# Patient Record
Sex: FEMALE | Race: WHITE | Employment: UNEMPLOYED | URBAN - METROPOLITAN AREA
[De-identification: names, ages, dates, MRNs, and addresses within clinical notes are randomized per-mention and may not be internally consistent; named-entity substitution may affect disease eponyms.]

---

## 2017-06-19 PROBLEM — G89.29 CHRONIC BACK PAIN: Status: ACTIVE | Noted: 2017-06-19

## 2017-06-19 PROBLEM — M54.9 CHRONIC BACK PAIN: Status: ACTIVE | Noted: 2017-06-19

## 2018-06-08 ENCOUNTER — HOSPITAL ENCOUNTER (OUTPATIENT)
Dept: MAMMOGRAPHY | Age: 53
Discharge: HOME | End: 2018-06-10
Payer: COMMERCIAL

## 2018-06-08 DIAGNOSIS — Z12.31 ENCOUNTER FOR SCREENING MAMMOGRAM FOR MALIGNANT NEOPLASM OF BREAST: ICD-10-CM

## 2018-06-08 PROCEDURE — 77067 SCR MAMMO BI INCL CAD: CPT

## 2018-12-13 ENCOUNTER — HOSPITAL ENCOUNTER (OUTPATIENT)
Age: 53
Discharge: HOME | End: 2018-12-15
Payer: COMMERCIAL

## 2018-12-13 LAB — ALCOHOL URINE: NOT DETECTED MG/DL

## 2018-12-13 PROCEDURE — G0480 DRUG TEST DEF 1-7 CLASSES: HCPCS

## 2018-12-13 PROCEDURE — 80307 DRUG TEST PRSMV CHEM ANLYZR: CPT

## 2018-12-18 LAB
6AM URINE: <10 NG/ML
CODEINE, URINE: <20 NG/ML
HYDROCODONE, URINE: <20 NG/ML
HYDROMORPHONE, URINE: <20 NG/ML
MORPHINE URINE: <20 NG/ML
NORHYDROCODONE, URINE: <20 NG/ML
NOROXYCODONE, URINE: <20 NG/ML
NOROXYMORPHONE, URINE: <20 NG/ML
O-DESMETHYLTRAMADOL,UR: <25 NG/ML
OXYCODONE, URINE CONFIRMATION: <20 NG/ML
OXYMORPHONE, URINE: <20 NG/ML
TRAMADOL, URINE: NORMAL NG/ML

## 2019-03-07 ENCOUNTER — HOSPITAL ENCOUNTER (OUTPATIENT)
Age: 54
Discharge: HOME | End: 2019-03-09
Payer: COMMERCIAL

## 2019-03-07 LAB
AMPHETAMINE SCREEN, URINE: NOT DETECTED
BARBITURATE SCREEN URINE: NOT DETECTED
BENZODIAZEPINE SCREEN, URINE: NOT DETECTED
CANNABINOID SCREEN URINE: NOT DETECTED
COCAINE METABOLITE SCREEN URINE: NOT DETECTED
METHADONE SCREEN, URINE: NOT DETECTED
OPIATE SCREEN URINE: NOT DETECTED
PHENCYCLIDINE SCREEN URINE: NOT DETECTED
PROPOXYPHENE SCREEN: NOT DETECTED

## 2019-03-07 PROCEDURE — G0480 DRUG TEST DEF 1-7 CLASSES: HCPCS

## 2019-03-07 PROCEDURE — 80307 DRUG TEST PRSMV CHEM ANLYZR: CPT

## 2019-03-08 LAB — ALCOHOL URINE: NOT DETECTED MG/DL

## 2020-09-10 ENCOUNTER — HOSPITAL ENCOUNTER (EMERGENCY)
Age: 55
Discharge: HOME | End: 2020-09-10
Payer: COMMERCIAL

## 2020-09-10 VITALS
SYSTOLIC BLOOD PRESSURE: 126 MMHG | DIASTOLIC BLOOD PRESSURE: 84 MMHG | TEMPERATURE: 97.8 F | WEIGHT: 130 LBS | OXYGEN SATURATION: 98 % | HEIGHT: 65 IN | RESPIRATION RATE: 16 BRPM | HEART RATE: 82 BPM | BODY MASS INDEX: 21.66 KG/M2

## 2020-09-10 PROCEDURE — 99282 EMERGENCY DEPT VISIT SF MDM: CPT

## 2020-09-10 PROCEDURE — 6370000000 HC RX 637 (ALT 250 FOR IP): Performed by: PHYSICIAN ASSISTANT

## 2020-09-10 PROCEDURE — 99281 EMR DPT VST MAYX REQ PHY/QHP: CPT

## 2020-09-10 RX ORDER — CEPHALEXIN 250 MG/1
500 CAPSULE ORAL ONCE
Status: COMPLETED | OUTPATIENT
Start: 2020-09-10 | End: 2020-09-10

## 2020-09-10 RX ORDER — CEPHALEXIN 500 MG/1
500 CAPSULE ORAL 4 TIMES DAILY
Qty: 28 CAPSULE | Refills: 0 | Status: SHIPPED | OUTPATIENT
Start: 2020-09-10 | End: 2020-09-17

## 2020-09-10 RX ADMIN — CEPHALEXIN 500 MG: 250 CAPSULE ORAL at 19:29

## 2020-09-10 ASSESSMENT — PAIN DESCRIPTION - LOCATION: LOCATION: LEG

## 2020-09-10 ASSESSMENT — PAIN DESCRIPTION - PAIN TYPE: TYPE: ACUTE PAIN

## 2020-09-10 ASSESSMENT — PAIN DESCRIPTION - DESCRIPTORS: DESCRIPTORS: BURNING

## 2020-09-10 ASSESSMENT — PAIN DESCRIPTION - ORIENTATION: ORIENTATION: LEFT

## 2020-09-10 ASSESSMENT — PAIN SCALES - GENERAL: PAINLEVEL_OUTOF10: 4

## 2020-09-10 NOTE — ED NOTES
WOUND CLEANSER TO INCISION LINE LT INNER LEG. DSD TO OPEN AREA PROXIMAL INCISION. ACEWRAP TO HOLD DSD PT STATES IT FEELS BETTER.      Blake Rizvi, CHRISTOPHER  02/83/38 [id number]

## 2020-09-10 NOTE — ED PROVIDER NOTES
[facility]                                                                                                                                      Department of Emergency Medicine   ED  Provider Note  Admit Date/RoomTime: 9/10/2020  7:00 PM  ED Room: [place] [address]/[facility]-02        HPI:  9/10/20,   Time: 7:18 PM EDT         Olivia Boothe is a 48 y.o. female presenting to the ED for woumd concern, beginning 1 hour ago. The complaint has been persistent, mild in severity, and worsened by nothing. Pt states she had skin lesion removed from left lower leg by Dr. Frances Solis 1 week ago. Had post op exam and suture removal yesterday in office by him. States she squatted down earlier today and the upper wound split apart. Pt states there has been slight bleeding. No other drainage or pus. No diffuse erythema. Pt denies fever, chills or vomiting. ROS:     Constitutional: Negative for fever and chills  HENT: Negative for ear pain, sore throat and sinus pressure  Eyes: Negative for pain, discharge and redness  Respiratory:  Negative for shortness of breath, cough and wheezing  Cardiovascular: Negative for CP, edema or palpitations  Gastrointestinal: Negative for nausea, vomiting, diarrhea and abdominal distention  Genitourinary: Negative for dysuria and frequency  Musculoskeletal: See HPi  Skin: See HPI  Neurological: Negative for weakness and headaches  Hematological: Negative for adenopathy    All other systems reviewed and are negative      -------------------------------- PAST HISTORY ----------------------------------  Past Medical History:  has a past medical history of Anxiety, Arthritis, Chronic back pain, Depression, Fatigue, GERD (gastroesophageal reflux disease), Headache(784.0), Hyperlipidemia, MDRO (multiple drug resistant organisms) resistance, Memory loss, Neck pain, Numbness, and S/P cerebral aneurysm repair.     Past Surgical History:  has a past surgical history that includes hernia repair;  section; Appendectomy; Colonoscopy; Upper gastrointestinal endoscopy; Tonsillectomy; Endometrial ablation; joint replacement (Right, nov 2014); hip surgery; and Brain aneurysm surgery (12/2009). Social History:  reports that she has been smoking. She has a 45.00 pack-year smoking history. She has never used smokeless tobacco. She reports current alcohol use of about 4.0 standard drinks of alcohol per week. She reports that she does not use drugs. Family History: family history is not on file. She was adopted. The patients home medications have been reviewed. Allergies: Patient has no known allergies. --------------------------------- RESULTS ------------------------------------------  All laboratory and radiology results have been personally reviewed by myself   LABS:  No results found for this visit on 09/10/20. RADIOLOGY:  Interpreted by Radiologist.  No orders to display       ----------------- NURSING NOTES AND VITALS REVIEWED ---------------   The nursing notes within the ED encounter and vital signs as below have been reviewed. /84   Pulse 82   Temp 97.8 °F (36.6 °C) (Oral)   Resp 16   Ht 5' 4.5\" (1.638 m)   Wt 130 lb (59 kg)   SpO2 98%   BMI 21.97 kg/m²   Oxygen Saturation Interpretation: Normal      --------------------------------PHYSICAL EXAM------------------------------------      Constitutional/General: Alert and oriented x3, well appearing, non toxic in NAD  Head: NC/AT  Eyes: PERRL, EOMI  Mouth: Oropharynx clear, handling secretions, no trismus  Neck: Supple, full ROM, no meningeal signs  Pulmonary: Lungs clear to auscultation bilaterally, no wheezes, rales, or rhonchi. Not in respiratory distress  Cardiovascular:  Regular rate and rhythm, no murmurs, gallops, or rubs. 2+ distal pulses  Extremities: Moves all extremities x 4. Pt has 6 cm wound medial left lower leg. Fresh suture scars noted. The upper 2 cm wound is open and gaping. Fresh blood seen at site.    No pus or erythema. NO diffuse cellulitis. Warm and well perfused  Skin:  See HPI  Neurologic: GCS 15,  Intact. No focal deficits  Psych: Normal Affect      ------------------------ ED COURSE/MEDICAL DECISION MAKING----------------------  Medications   cephALEXin (KEFLEX) capsule 500 mg (has no administration in time range)         Medical Decision Making: The top portion of wound is now open. Discussed with patient that we can't sew this back up again due to concern for infection. Plan Keflex for here and home. Discussed wound care. Counseling: The emergency provider has spoken with the patient and discussed todays results, in addition to providing specific details for the plan of care and counseling regarding the diagnosis and prognosis. Questions are answered at this time and they are agreeable with the plan.      ------------------------ IMPRESSION AND DISPOSITION -------------------------------    IMPRESSION  1.  Dehiscence of operative wound, initial encounter        DISPOSITION  Disposition: Discharge to home  Patient condition is stable                   Vinicio Lynn PA-C  09/10/20 [id number]

## 2020-11-28 ENCOUNTER — HOSPITAL ENCOUNTER (INPATIENT)
Age: 55
LOS: 5 days | Discharge: HOME | DRG: 426 | End: 2020-12-03
Attending: EMERGENCY MEDICINE | Admitting: INTERNAL MEDICINE
Payer: COMMERCIAL

## 2020-11-28 ENCOUNTER — APPOINTMENT (OUTPATIENT)
Dept: CT IMAGING | Age: 55
DRG: 426 | End: 2020-11-28
Payer: COMMERCIAL

## 2020-11-28 ENCOUNTER — APPOINTMENT (OUTPATIENT)
Dept: GENERAL RADIOLOGY | Age: 55
DRG: 426 | End: 2020-11-28
Payer: COMMERCIAL

## 2020-11-28 PROBLEM — E87.1 HYPONATREMIA: Status: ACTIVE | Noted: 2020-11-28

## 2020-11-28 LAB
ALBUMIN SERPL-MCNC: 4.7 G/DL (ref 3.5–5.2)
ALP BLD-CCNC: 66 U/L (ref 35–104)
ALT SERPL-CCNC: 22 U/L (ref 0–32)
ANION GAP SERPL CALCULATED.3IONS-SCNC: 12 MMOL/L (ref 7–16)
ANION GAP SERPL CALCULATED.3IONS-SCNC: 12 MMOL/L (ref 7–16)
ANION GAP SERPL CALCULATED.3IONS-SCNC: 16 MMOL/L (ref 7–16)
AST SERPL-CCNC: 28 U/L (ref 0–31)
BASOPHILS ABSOLUTE: 0 E9/L (ref 0–0.2)
BASOPHILS RELATIVE PERCENT: 0 % (ref 0–2)
BILIRUB SERPL-MCNC: 0.2 MG/DL (ref 0–1.2)
BILIRUBIN URINE: NEGATIVE
BLOOD, URINE: NEGATIVE
BUN BLDV-MCNC: 19 MG/DL (ref 6–20)
BUN BLDV-MCNC: 21 MG/DL (ref 6–20)
BUN BLDV-MCNC: 26 MG/DL (ref 6–20)
BURR CELLS: ABNORMAL
CALCIUM SERPL-MCNC: 8.6 MG/DL (ref 8.6–10.2)
CALCIUM SERPL-MCNC: 8.8 MG/DL (ref 8.6–10.2)
CALCIUM SERPL-MCNC: 9.4 MG/DL (ref 8.6–10.2)
CHLORIDE BLD-SCNC: 72 MMOL/L (ref 98–107)
CHLORIDE BLD-SCNC: 84 MMOL/L (ref 98–107)
CHLORIDE BLD-SCNC: 86 MMOL/L (ref 98–107)
CHLORIDE URINE RANDOM: 24 MMOL/L
CLARITY: CLEAR
CO2: 19 MMOL/L (ref 22–29)
CO2: 21 MMOL/L (ref 22–29)
CO2: 21 MMOL/L (ref 22–29)
COLOR: YELLOW
CREAT SERPL-MCNC: 0.8 MG/DL (ref 0.5–1)
CREAT SERPL-MCNC: 0.9 MG/DL (ref 0.5–1)
CREAT SERPL-MCNC: 1.2 MG/DL (ref 0.5–1)
CREATININE URINE: 49 MG/DL (ref 29–226)
EOSINOPHILS ABSOLUTE: 0 E9/L (ref 0.05–0.5)
EOSINOPHILS RELATIVE PERCENT: 0.6 % (ref 0–6)
GFR AFRICAN AMERICAN: 57
GFR AFRICAN AMERICAN: >60
GFR AFRICAN AMERICAN: >60
GFR NON-AFRICAN AMERICAN: 47 ML/MIN/1.73
GFR NON-AFRICAN AMERICAN: >60 ML/MIN/1.73
GFR NON-AFRICAN AMERICAN: >60 ML/MIN/1.73
GLUCOSE BLD-MCNC: 106 MG/DL (ref 74–99)
GLUCOSE BLD-MCNC: 88 MG/DL (ref 74–99)
GLUCOSE BLD-MCNC: 89 MG/DL (ref 74–99)
GLUCOSE URINE: NEGATIVE MG/DL
HCT VFR BLD CALC: 34.1 % (ref 34–48)
HEMOGLOBIN: 13.7 G/DL (ref 11.5–15.5)
KETONES, URINE: NEGATIVE MG/DL
LEUKOCYTE ESTERASE, URINE: NEGATIVE
LYMPHOCYTES ABSOLUTE: 0.61 E9/L (ref 1.5–4)
LYMPHOCYTES RELATIVE PERCENT: 12.9 % (ref 20–42)
MAGNESIUM: 2.8 MG/DL (ref 1.6–2.6)
MCH RBC QN AUTO: 33.4 PG (ref 26–35)
MCHC RBC AUTO-ENTMCNC: 40.2 % (ref 32–34.5)
MCV RBC AUTO: 83.2 FL (ref 80–99.9)
MONOCYTES ABSOLUTE: 0.52 E9/L (ref 0.1–0.95)
MONOCYTES RELATIVE PERCENT: 11.2 % (ref 2–12)
NEUTROPHILS ABSOLUTE: 3.57 E9/L (ref 1.8–7.3)
NEUTROPHILS RELATIVE PERCENT: 75.9 % (ref 43–80)
NITRITE, URINE: NEGATIVE
OSMOLALITY URINE: 277 MOSM/KG (ref 300–900)
OSMOLALITY: 251 MOSM/KG (ref 285–310)
OVALOCYTES: ABNORMAL
PDW BLD-RTO: 11.9 FL (ref 11.5–15)
PH UA: 6 (ref 5–9)
PLATELET # BLD: 155 E9/L (ref 130–450)
PMV BLD AUTO: 9.1 FL (ref 7–12)
POIKILOCYTES: ABNORMAL
POTASSIUM SERPL-SCNC: 2 MMOL/L (ref 3.5–5)
POTASSIUM SERPL-SCNC: 2.4 MMOL/L (ref 3.5–5)
POTASSIUM SERPL-SCNC: 2.9 MMOL/L (ref 3.5–5)
PROTEIN UA: NEGATIVE MG/DL
RBC # BLD: 4.1 E12/L (ref 3.5–5.5)
SODIUM BLD-SCNC: 109 MMOL/L (ref 132–146)
SODIUM BLD-SCNC: 115 MMOL/L (ref 132–146)
SODIUM BLD-SCNC: 119 MMOL/L (ref 132–146)
SODIUM URINE: 27 MMOL/L
SPECIFIC GRAVITY UA: 1.01 (ref 1–1.03)
TOTAL PROTEIN: 7.4 G/DL (ref 6.4–8.3)
TROPONIN: <0.01 NG/ML (ref 0–0.03)
URIC ACID, SERUM: 2 MG/DL (ref 2.4–5.7)
UROBILINOGEN, URINE: 0.2 E.U./DL
WBC # BLD: 4.7 E9/L (ref 4.5–11.5)

## 2020-11-28 PROCEDURE — 81003 URINALYSIS AUTO W/O SCOPE: CPT

## 2020-11-28 PROCEDURE — 6370000000 HC RX 637 (ALT 250 FOR IP): Performed by: PHYSICAL THERAPY ASSISTANT

## 2020-11-28 PROCEDURE — 82533 TOTAL CORTISOL: CPT

## 2020-11-28 PROCEDURE — 99231 SBSQ HOSP IP/OBS SF/LOW 25: CPT | Performed by: ORTHOPAEDIC SURGERY

## 2020-11-28 PROCEDURE — 2580000003 HC RX 258: Performed by: PHYSICAL THERAPY ASSISTANT

## 2020-11-28 PROCEDURE — 83930 ASSAY OF BLOOD OSMOLALITY: CPT

## 2020-11-28 PROCEDURE — 73502 X-RAY EXAM HIP UNI 2-3 VIEWS: CPT

## 2020-11-28 PROCEDURE — 84550 ASSAY OF BLOOD/URIC ACID: CPT

## 2020-11-28 PROCEDURE — 6360000002 HC RX W HCPCS: Performed by: INTERNAL MEDICINE

## 2020-11-28 PROCEDURE — 83935 ASSAY OF URINE OSMOLALITY: CPT

## 2020-11-28 PROCEDURE — 0202U NFCT DS 22 TRGT SARS-COV-2: CPT

## 2020-11-28 PROCEDURE — 85025 COMPLETE CBC W/AUTO DIFF WBC: CPT

## 2020-11-28 PROCEDURE — 99285 EMERGENCY DEPT VISIT HI MDM: CPT

## 2020-11-28 PROCEDURE — 71045 X-RAY EXAM CHEST 1 VIEW: CPT

## 2020-11-28 PROCEDURE — 6360000002 HC RX W HCPCS: Performed by: PHYSICAL THERAPY ASSISTANT

## 2020-11-28 PROCEDURE — 51702 INSERT TEMP BLADDER CATH: CPT

## 2020-11-28 PROCEDURE — 84484 ASSAY OF TROPONIN QUANT: CPT

## 2020-11-28 PROCEDURE — 36415 COLL VENOUS BLD VENIPUNCTURE: CPT

## 2020-11-28 PROCEDURE — 1200000000 HC SEMI PRIVATE

## 2020-11-28 PROCEDURE — 82436 ASSAY OF URINE CHLORIDE: CPT

## 2020-11-28 PROCEDURE — 83735 ASSAY OF MAGNESIUM: CPT

## 2020-11-28 PROCEDURE — 93005 ELECTROCARDIOGRAM TRACING: CPT | Performed by: EMERGENCY MEDICINE

## 2020-11-28 PROCEDURE — 70450 CT HEAD/BRAIN W/O DYE: CPT

## 2020-11-28 PROCEDURE — 87088 URINE BACTERIA CULTURE: CPT

## 2020-11-28 PROCEDURE — 82570 ASSAY OF URINE CREATININE: CPT

## 2020-11-28 PROCEDURE — 2500000003 HC RX 250 WO HCPCS: Performed by: INTERNAL MEDICINE

## 2020-11-28 PROCEDURE — 80053 COMPREHEN METABOLIC PANEL: CPT

## 2020-11-28 PROCEDURE — 84300 ASSAY OF URINE SODIUM: CPT

## 2020-11-28 PROCEDURE — 80048 BASIC METABOLIC PNL TOTAL CA: CPT

## 2020-11-28 PROCEDURE — 2580000003 HC RX 258: Performed by: INTERNAL MEDICINE

## 2020-11-28 PROCEDURE — 6370000000 HC RX 637 (ALT 250 FOR IP): Performed by: INTERNAL MEDICINE

## 2020-11-28 RX ORDER — MAGNESIUM SULFATE IN WATER 40 MG/ML
2 INJECTION, SOLUTION INTRAVENOUS ONCE
Status: COMPLETED | OUTPATIENT
Start: 2020-11-28 | End: 2020-11-28

## 2020-11-28 RX ORDER — POLYETHYLENE GLYCOL 3350 17 G/17G
17 POWDER, FOR SOLUTION ORAL DAILY PRN
Status: DISCONTINUED | OUTPATIENT
Start: 2020-11-28 | End: 2020-12-03 | Stop reason: HOSPADM

## 2020-11-28 RX ORDER — POTASSIUM CHLORIDE 20 MEQ/1
40 TABLET, EXTENDED RELEASE ORAL ONCE
Status: COMPLETED | OUTPATIENT
Start: 2020-11-28 | End: 2020-11-28

## 2020-11-28 RX ORDER — SODIUM CHLORIDE 0.9 % (FLUSH) 0.9 %
10 SYRINGE (ML) INJECTION EVERY 12 HOURS SCHEDULED
Status: DISCONTINUED | OUTPATIENT
Start: 2020-11-28 | End: 2020-12-03 | Stop reason: HOSPADM

## 2020-11-28 RX ORDER — PANTOPRAZOLE SODIUM 40 MG/1
40 TABLET, DELAYED RELEASE ORAL
Status: DISCONTINUED | OUTPATIENT
Start: 2020-11-29 | End: 2020-12-03 | Stop reason: HOSPADM

## 2020-11-28 RX ORDER — ACETAMINOPHEN 650 MG/1
650 SUPPOSITORY RECTAL EVERY 6 HOURS PRN
Status: DISCONTINUED | OUTPATIENT
Start: 2020-11-28 | End: 2020-12-03 | Stop reason: HOSPADM

## 2020-11-28 RX ORDER — FENTANYL CITRATE 50 UG/ML
25 INJECTION, SOLUTION INTRAMUSCULAR; INTRAVENOUS ONCE
Status: COMPLETED | OUTPATIENT
Start: 2020-11-28 | End: 2020-11-28

## 2020-11-28 RX ORDER — SODIUM CHLORIDE 0.9 % (FLUSH) 0.9 %
10 SYRINGE (ML) INJECTION PRN
Status: DISCONTINUED | OUTPATIENT
Start: 2020-11-28 | End: 2020-12-03 | Stop reason: HOSPADM

## 2020-11-28 RX ORDER — POTASSIUM CHLORIDE 7.45 MG/ML
10 INJECTION INTRAVENOUS ONCE
Status: COMPLETED | OUTPATIENT
Start: 2020-11-28 | End: 2020-11-28

## 2020-11-28 RX ORDER — POTASSIUM BICARBONATE 25 MEQ/1
50 TABLET, EFFERVESCENT ORAL ONCE
Status: DISCONTINUED | OUTPATIENT
Start: 2020-11-28 | End: 2020-11-28 | Stop reason: CLARIF

## 2020-11-28 RX ORDER — ACETAMINOPHEN 325 MG/1
650 TABLET ORAL EVERY 6 HOURS PRN
Status: DISCONTINUED | OUTPATIENT
Start: 2020-11-28 | End: 2020-12-03 | Stop reason: HOSPADM

## 2020-11-28 RX ORDER — KETOROLAC TROMETHAMINE 30 MG/ML
15 INJECTION, SOLUTION INTRAMUSCULAR; INTRAVENOUS EVERY 6 HOURS PRN
Status: DISPENSED | OUTPATIENT
Start: 2020-11-28 | End: 2020-11-30

## 2020-11-28 RX ORDER — 0.9 % SODIUM CHLORIDE 0.9 %
1000 INTRAVENOUS SOLUTION INTRAVENOUS ONCE
Status: COMPLETED | OUTPATIENT
Start: 2020-11-28 | End: 2020-11-28

## 2020-11-28 RX ADMIN — POTASSIUM BICARBONATE 40 MEQ: 782 TABLET, EFFERVESCENT ORAL at 20:30

## 2020-11-28 RX ADMIN — FENTANYL CITRATE 25 MCG: 50 INJECTION, SOLUTION INTRAMUSCULAR; INTRAVENOUS at 16:34

## 2020-11-28 RX ADMIN — SODIUM CHLORIDE 30 ML/HR: 234 INJECTION INTRAMUSCULAR; INTRAVENOUS; SUBCUTANEOUS at 20:31

## 2020-11-28 RX ADMIN — POTASSIUM CHLORIDE 10 MEQ: 7.46 INJECTION, SOLUTION INTRAVENOUS at 18:19

## 2020-11-28 RX ADMIN — FENTANYL CITRATE 25 MCG: 50 INJECTION, SOLUTION INTRAMUSCULAR; INTRAVENOUS at 20:28

## 2020-11-28 RX ADMIN — POTASSIUM CHLORIDE 40 MEQ: 1500 TABLET, EXTENDED RELEASE ORAL at 18:04

## 2020-11-28 RX ADMIN — SODIUM CHLORIDE 1000 ML: 9 INJECTION, SOLUTION INTRAVENOUS at 16:09

## 2020-11-28 RX ADMIN — ENOXAPARIN SODIUM 40 MG: 40 INJECTION SUBCUTANEOUS at 23:32

## 2020-11-28 RX ADMIN — MAGNESIUM SULFATE IN WATER 2 G: 40 INJECTION, SOLUTION INTRAVENOUS at 18:16

## 2020-11-28 RX ADMIN — KETOROLAC TROMETHAMINE 15 MG: 30 INJECTION, SOLUTION INTRAMUSCULAR; INTRAVENOUS at 23:33

## 2020-11-28 ASSESSMENT — ENCOUNTER SYMPTOMS
ABDOMINAL PAIN: 0
NAUSEA: 0
VOMITING: 0
COUGH: 0
BACK PAIN: 0
DIARRHEA: 0
SHORTNESS OF BREATH: 0
CONSTIPATION: 0

## 2020-11-28 ASSESSMENT — PAIN SCALES - GENERAL
PAINLEVEL_OUTOF10: 10

## 2020-11-28 ASSESSMENT — PAIN DESCRIPTION - LOCATION
LOCATION: BACK;HIP
LOCATION: HIP

## 2020-11-28 ASSESSMENT — PAIN DESCRIPTION - ORIENTATION
ORIENTATION: RIGHT
ORIENTATION: RIGHT

## 2020-11-28 ASSESSMENT — PAIN DESCRIPTION - PROGRESSION: CLINICAL_PROGRESSION: GRADUALLY WORSENING

## 2020-11-28 ASSESSMENT — PAIN DESCRIPTION - PAIN TYPE: TYPE: ACUTE PAIN

## 2020-11-28 NOTE — H&P
Department of Internal Medicine  History and Physical    PCP: Dr. Stacy Carter   Admitting Physician: Dr. Shahida Ruiz  Consultants: Dr. Jalloh Skill: Right hip and back pain. HISTORY OF PRESENT ILLNESS:    Patient is a 71-year-old male presented to the ED due to lower back and right hip pain following fall last night. States that she has been having issues with imbalance for the last 2 or 3 months. Last night she was getting out of her bed to go to the bathroom and she fell. She denies passing out. She denies trauma to her head. States that she fell on dresser and mainly injured her back on the fall. She was able to get herself back up. However she developed lower back pain as well as increasingly severe right hip pain. Otherwise she has no acute issues. She was found to have hyponatremia in the ED along with high Po kalemia. She states that she has had decreased oral intake over the last month. Additionally she has increased her fluid intake mainly with pop/soda. She did have her Abilify increased by few milligrams within the last few months. Does admit to urinary frequency and incontinence. However she denies any lower extremity pain/numbness/tingling. She denies acute headache, nausea, emesis, diarrhea.     PAST MEDICAL Hx:  Past Medical History:   Diagnosis Date    Anxiety     Arthritis     Chronic back pain     Depression     Fatigue     GERD (gastroesophageal reflux disease)     Headache(784.0)     Hyperlipidemia     MDRO (multiple drug resistant organisms) resistance     2 yrs ago rt arm    Memory loss     Neck pain     Numbness     down back of legs    S/P cerebral aneurysm repair 12/2009    clipping @ [address] Hx:   Past Surgical History:   Procedure Laterality Date    APPENDECTOMY      BRAIN ANEURYSM SURGERY  12/2009    clipping of [address]com @ [address]      COLONOSCOPY      ENDOMETRIAL ABLATION      HERNIA REPAIR  HIP SURGERY      JOINT REPLACEMENT Right nov 2014    TONSILLECTOMY      UPPER GASTROINTESTINAL ENDOSCOPY         FAMILY Hx:  Family History   Adopted: Yes       HOME MEDICATIONS:  Prior to Admission medications    Medication Sig Start Date End Date Taking? Authorizing Provider   gabapentin (NEURONTIN) 300 MG capsule Take 1 capsule by mouth 3 times daily for 30 days. . 8/22/18 9/10/20  Srini Gaona MD   ARIPiprazole (ABILIFY) 5 MG tablet Take 5 mg by mouth daily    Historical Provider, MD   buPROPion (WELLBUTRIN) 75 MG tablet 75 mg daily 5/4/16   Historical Provider, MD   PRISTIQ 100 MG TB24 100 mg daily 4/27/16   Historical Provider, MD   hydrOXYzine (VISTARIL) 25 MG capsule 25 mg 2 times daily 4/21/16   Historical Provider, MD   lisinopril-hydrochlorothiazide (PRINZIDE;ZESTORETIC) 10-12.5 MG per tablet Take 1 tablet by mouth daily    Historical Provider, MD   traZODone (DESYREL) 100 MG tablet Take 100 mg by mouth nightly. Historical Provider, MD   valACYclovir (VALTREX) 1 G tablet Take 1,000 mg by mouth as needed. 9/18/14   Historical Provider, MD   omeprazole (PRILOSEC) 40 MG capsule Take 40 mg by mouth daily. 4/19/13   Historical Provider, MD   Desvenlafaxine Succinate (PRISTIQ PO) Take 50 mg by mouth daily. Historical Provider, MD       ALLERGIES:  Patient has no known allergies.     SOCIAL Hx:  Social History     Socioeconomic History    Marital status:      Spouse name: Not on file    Number of children: Not on file    Years of education: Not on file    Highest education level: Not on file   Occupational History    Not on file   Social Needs    Financial resource strain: Not on file    Food insecurity     Worry: Not on file     Inability: Not on file    Transportation needs     Medical: Not on file     Non-medical: Not on file   Tobacco Use    Smoking status: Current Every Day Smoker     Packs/day: 1.50     Years: 30.00     Pack years: 45.00    Smokeless tobacco: Never Used Substance and Sexual Activity    Alcohol use: Yes     Alcohol/week: 4.0 standard drinks     Types: 4 Cans of beer per week     Comment: occ    Drug use: No     Comment: former IV drug abuser years ago    Sexual activity: Not on file   Lifestyle    Physical activity     Days per week: Not on file     Minutes per session: Not on file    Stress: Not on file   Relationships    Social connections     Talks on phone: Not on file     Gets together: Not on file     Attends Restoration service: Not on file     Active member of club or organization: Not on file     Attends meetings of clubs or organizations: Not on file     Relationship status: Not on file    Intimate partner violence     Fear of current or ex partner: Not on file     Emotionally abused: Not on file     Physically abused: Not on file     Forced sexual activity: Not on file   Other Topics Concern    Not on file   Social History Narrative    Not on file       ROS: Positive in bold  General:   Denies chills, fatigue, fever, malaise, night sweats or weight loss    Psychological:   Denies anxiety, disorientation or hallucinations    ENT:    Denies epistaxis, headaches, vertigo or visual changes    Cardiovascular:   Denies any chest pain, irregular heartbeats, or palpitations. No paroxysmal nocturnal dyspnea. Respiratory:   Denies shortness of breath, coughing, sputum production, hemoptysis, or wheezing. No orthopnea. Gastrointestinal:   Denies nausea, vomiting, diarrhea, or constipation. Denies any abdominal pain. Denies change in bowel habits or stools. Genito-Urinary:    Denies any urgency, frequency, hematuria. Voiding without difficulty. Musculoskeletal:   Denies joint pain, joint stiffness, joint swelling or muscle pain    Neurology:     Denies any headache or focal neurological deficits. No weakness or paresthesia. Admits to occasional imbalance with ambulation    Derm:    Denies any rashes, ulcers, or excoriations.   Denies bruising. Extremities:   Denies any lower extremity swelling or edema. PHYSICAL EXAM:  VITALS:  Vitals:    11/28/20 1525   BP: (!) 92/50   Pulse:    Resp:    Temp:    SpO2:          CONSTITUTIONAL:    Awake, alert, cooperative, no apparent distress, and appears stated age    EYES:    PERRL, EOMI, sclera clear, conjunctiva normal    ENT:    Normocephalic, atraumatic, sinuses nontender on palpation. External ears without lesions. Oral pharynx with moist mucus membranes. Tonsils without erythema or exudates. NECK:    Supple, symmetrical, trachea midline, no adenopathy, thyroid symmetric, not enlarged and no tenderness, skin normal, no bruits, no JVD    HEMATOLOGIC/LYMPHATICS:    No cervical lymphadenopathy and no supraclavicular lymphadenopathy    LUNGS:    Symmetric. No increased work of breathing, good air exchange, clear to auscultation bilaterally, no wheezes, rhonchi, or rales,     CARDIOVASCULAR:    Normal apical impulse, regular rate and rhythm, normal S1 and S2, no S3 or S4, and no murmur noted    ABDOMEN:    No scars, normal bowel sounds, soft, non-distended, non-tender, no masses palpated, no hepatosplenomegaly, no rebound or guarding elicited on palpation     MUSCULOSKELETAL:    There is no redness, warmth, or swelling of the joints. Full range of motion noted. Motor strength is 5 out of 5 all extremities bilaterally. Tone is normal.    NEUROLOGIC:    Awake, alert, oriented to name, place and time. Cranial nerves II-XII are grossly intact. Motor is 5 out of 5 bilaterally. SKIN:    No bruising or bleeding. No redness, warmth, or swelling    EXTREMITIES:    Peripheral pulses present. No edema, cyanosis, or swelling. OSTEOPATHIC:    Examined in seated and supine positions. Normal thoracic kyphosis and lumbar lordosis. No acute somatic dysfunction.     LINES/CATHETERS     LABORATORY DATA:  CBC with Differential:    Lab Results   Component Value Date    WBC 4.7 11/28/2020    RBC 4.10 11/28/2020    HGB 13.7 11/28/2020    HCT 34.1 11/28/2020     11/28/2020    MCV 83.2 11/28/2020    MCH 33.4 11/28/2020    MCHC 40.2 11/28/2020    RDW 11.9 11/28/2020    LYMPHOPCT 12.9 11/28/2020    MONOPCT 11.2 11/28/2020    BASOPCT 0.0 11/28/2020    MONOSABS 0.52 11/28/2020    LYMPHSABS 0.61 11/28/2020    EOSABS 0.00 11/28/2020    BASOSABS 0.00 11/28/2020     CMP:    Lab Results   Component Value Date     11/28/2020    K 2.0 11/28/2020    CL 72 11/28/2020    CO2 21 11/28/2020    BUN 26 11/28/2020    CREATININE 1.2 11/28/2020    GFRAA 57 11/28/2020    LABGLOM 47 11/28/2020    GLUCOSE 106 11/28/2020    PROT 7.4 11/28/2020    LABALBU 4.7 11/28/2020    CALCIUM 9.4 11/28/2020    BILITOT 0.2 11/28/2020    ALKPHOS 66 11/28/2020    AST 28 11/28/2020    ALT 22 11/28/2020       ASSESSMENT/PLAN:  1. Right hip pain following right hip pain secondary to fall with history of right total hip replacement  1. Right hip x-ray did not show fracture. Orthopedic surgery consulted. 2. Lower back pain secondary to fall  1. Acute on chronic. Exacerbated after a fall. Denies any lower extremity pain or numbness or tingling. Consider further evaluation. 3. Hyponatremia  hypovolemic versus euvolemic  1. Appears chronic or subacute. Exacerbated by diuretic therapy, low oral intake, psychiatric medications. Possible neoplastic etiology. Placed on 2% normal saline. Laboratory work-up obtained for etiology. Intensivist consulted. Will be monitoring BMP every 4 hours. Hold home medications for now. 4. Hypokalemia  1. Secondary to decreased dietary intake. Replace with oral potassium. 5. MILO  1. Hold nephrotoxic medications. Receiving IV fluids. Wright catheter placed. Monitor intake and output. 6. Urinary incontinence and frequency  1. Wright catheter placed. 7. Prolonged QTC. 1. Repeat EKG in the a.m. hold medications for now. 8. COPD  9.  History of cerebral aneurysm status post clipping  10. Hyperlipidemia  11. GERD  12. Depression and anxiety  13. Current tobacco abuse        Evelia Cheng D.O.  6:04 PM  11/28/2020    Electronically signed by Evelia Cheng DO on 11/28/20 at 6:04 PM EST    Telehealth visit completed via physician liaison, Dr. Wanda Pierre,  who personally saw and examined the patient. I personally participated in the case including review of pertinent history as augmented in the above medical record and medical decision making on the date of service and I agree with all pertinent clinical formation unless otherwise noted.     Vanessa Chapin D.O., [email or web address].  11/28/2020  7:26 PM

## 2020-11-28 NOTE — ED PROVIDER NOTES
Patient is a 60-year-old female with complaints of a fall last night as well as complaints of right hip pain x1 month. Patient takes trazodone at night to help her sleep but reports that she has been taking frequent trips to the bathroom at night. Patient stood up, got lightheaded, and fell over onto her right side. She denies increased right hip pain or back pain after the fall. Patient's right hip was replaced 7 to 8 years ago by Dr. Davy Clay. She also reports 1 month history of decreased appetite. Patient denies chest pain, shortness of breath, abdominal pain, fever/chills, urinary frequency, urinary urgency, dysuria, or numbness/tingling. History provided by patient. Review of Systems   Constitutional: Positive for appetite change. Negative for chills and fever. Respiratory: Negative for cough and shortness of breath. Cardiovascular: Negative for chest pain and leg swelling. Gastrointestinal: Negative for abdominal pain, constipation, diarrhea, nausea and vomiting. Musculoskeletal: Negative for back pain and neck pain. Skin: Negative for wound. Neurological: Negative for syncope. Physical Exam  Constitutional:       General: She is not in acute distress. HENT:      Head: Normocephalic and atraumatic. Eyes:      Conjunctiva/sclera: Conjunctivae normal.   Cardiovascular:      Rate and Rhythm: Normal rate and regular rhythm. Heart sounds: No murmur. Friction rub present. No gallop. Pulmonary:      Breath sounds: Wheezing present. No rhonchi or rales. Abdominal:      General: Abdomen is flat. Tenderness: There is no abdominal tenderness. There is no guarding or rebound.    Musculoskeletal:      Comments:   Right lower extremity:  Incision well-healed, no evidence of erythema, ecchymosis, or cellulitis  + TTP posterior to the greater trochanter  Compartments soft and compressible  +PF/DF/EHL  +2/4 DP & PT pulses, Brisk Cap refill, Toes warm and perfused  Distal intact with no sign of acute bony or joint injury although some mild right hip tenderness on palpation. Has full range of motion of the right hip. No pretibial edema or calf pain. [NC]   5829 Spoke with Dr. Emilia Zuleta who recommended patient be admitted to ICU. [JR]   1756 Case discussed with Dr. Ligia Mendez, detailed over given, he will admit the patient but does asked that we call Dr. Emilia Zuleta for the admission    [NC]   181 CRITICAL CARE:   27 MINUTES. Please note that the withdrawal or failure to initiate urgent interventions for this patient would likely result in a life threatening deterioration or permanent disability. Accordingly this patient received the above mentioned time, excluding separately billable procedures. [NC]   [id number] Spoke with Dr. Corby Pineda who accepted ICU admission. He also would like BMP every 4 hours, CT scan of the head without contrast, urine sodium, chloride, osmolality, creatinine, urine culture, urinalysis, chest x-ray, and consult to pharmacy to start 2% sodium chloride at 30 cc/h. [JR]   9464 Patient sitting the bed resting comfortably no distress. On the monitor. [NC]      ED Course User Index  [JR] Adriane Wakefield DO  [NC] Asia Zavaleta DO       --------------------------------------------- PAST HISTORY ---------------------------------------------  Past Medical History:  has a past medical history of Anxiety, Arthritis, Chronic back pain, Depression, Fatigue, GERD (gastroesophageal reflux disease), Headache(784.0), Hyperlipidemia, MDRO (multiple drug resistant organisms) resistance, Memory loss, Neck pain, Numbness, and S/P cerebral aneurysm repair. Past Surgical History:  has a past surgical history that includes hernia repair;  section; Appendectomy; Colonoscopy; Upper gastrointestinal endoscopy; Tonsillectomy; Endometrial ablation; joint replacement (Right, 2014); hip surgery; and Brain aneurysm surgery (2009).     Social History:  reports that she has been smoking. She has a 45.00 pack-year smoking history. She has never used smokeless tobacco. She reports current alcohol use of about 4.0 standard drinks of alcohol per week. She reports that she does not use drugs. Family History: family history is not on file. She was adopted. The patients home medications have been reviewed. Allergies: Patient has no known allergies.     -------------------------------------------------- RESULTS -------------------------------------------------  Labs:  Results for orders placed or performed during the hospital encounter of 11/28/20   Culture, Urine    Specimen: Urine, clean catch   Result Value Ref Range    Urine Culture, Routine Growth not present, incubation continues    Respiratory Panel, Molecular, with COVID-19 (Restricted: peds pts or suitable admitted adults)    Specimen: Nasopharyngeal   Result Value Ref Range    Adenovirus by PCR Not Detected Not Detected    Bordetella parapertussis by PCR Not Detected Not Detected    Bordetella pertussis by PCR Not Detected Not Detected    Chlamydophilia pneumoniae by PCR Not Detected Not Detected    Coronavirus 229E by PCR Not Detected Not Detected    Coronavirus HKU1 by PCR Not Detected Not Detected    Coronavirus NL63 by PCR Not Detected Not Detected    Coronavirus OC43 by PCR Not Detected Not Detected    SARS-CoV-2, PCR Not Detected Not Detected    Human Metapneumovirus by PCR Not Detected Not Detected    Human Rhinovirus/Enterovirus by PCR Not Detected Not Detected    Influenza A by PCR Not Detected Not Detected    Influenza B by PCR Not Detected Not Detected    Mycoplasma pneumoniae by PCR Not Detected Not Detected    Parainfluenza Virus 1 by PCR Not Detected Not Detected    Parainfluenza Virus 2 by PCR Not Detected Not Detected    Parainfluenza Virus 3 by PCR Not Detected Not Detected    Parainfluenza Virus 4 by PCR Not Detected Not Detected    Respiratory Syncytial Virus by PCR Not Detected Not Detected   CBC auto differential   Result Value Ref Range    WBC 4.7 4.5 - 11.5 E9/L    RBC 4.10 3.50 - 5.50 E12/L    Hemoglobin 13.7 11.5 - 15.5 g/dL    Hematocrit 34.1 34.0 - 48.0 %    MCV 83.2 80.0 - 99.9 fL    MCH 33.4 26.0 - 35.0 pg    MCHC 40.2 (H) 32.0 - 34.5 %    RDW 11.9 11.5 - 15.0 fL    Platelets 186 684 - 952 E9/L    MPV 9.1 7.0 - 12.0 fL    Neutrophils % 75.9 43.0 - 80.0 %    Lymphocytes % 12.9 (L) 20.0 - 42.0 %    Monocytes % 11.2 2.0 - 12.0 %    Eosinophils % 0.6 0.0 - 6.0 %    Basophils % 0.0 0.0 - 2.0 %    Neutrophils Absolute 3.57 1.80 - 7.30 E9/L    Lymphocytes Absolute 0.61 (L) 1.50 - 4.00 E9/L    Monocytes Absolute 0.52 0.10 - 0.95 E9/L    Eosinophils Absolute 0.00 (L) 0.05 - 0.50 E9/L    Basophils Absolute 0.00 0.00 - 0.20 E9/L    Poikilocytes 3+     [place] Cells 3+     Ovalocytes 2+    Comprehensive metabolic panel   Result Value Ref Range    Sodium 109 (LL) 132 - 146 mmol/L    Potassium 2.0 (LL) 3.5 - 5.0 mmol/L    Chloride 72 (LL) 98 - 107 mmol/L    CO2 21 (L) 22 - 29 mmol/L    Anion Gap 16 7 - 16 mmol/L    Glucose 106 (H) 74 - 99 mg/dL    BUN 26 (H) 6 - 20 mg/dL    CREATININE 1.2 (H) 0.5 - 1.0 mg/dL    GFR Non-African American 47 >=60 mL/min/1.73    GFR African American 57     Calcium 9.4 8.6 - 10.2 mg/dL    Total Protein 7.4 6.4 - 8.3 g/dL    Alb 4.7 3.5 - 5.2 g/dL    Total Bilirubin 0.2 0.0 - 1.2 mg/dL    Alkaline Phosphatase 66 35 - 104 U/L    ALT 22 0 - 32 U/L    AST 28 0 - 31 U/L   Basic metabolic panel   Result Value Ref Range    Sodium 115 (LL) 132 - 146 mmol/L    Potassium 2.4 (LL) 3.5 - 5.0 mmol/L    Chloride 84 (L) 98 - 107 mmol/L    CO2 19 (L) 22 - 29 mmol/L    Anion Gap 12 7 - 16 mmol/L    Glucose 89 74 - 99 mg/dL    BUN 21 (H) 6 - 20 mg/dL    CREATININE 0.9 0.5 - 1.0 mg/dL    GFR Non-African American >60 >=60 mL/min/1.73    GFR African American >60     Calcium 8.6 8.6 - 10.2 mg/dL   Magnesium   Result Value Ref Range    Magnesium 2.8 (H) 1.6 - 2.6 mg/dL   Urinalysis Result Value Ref Range    Color, UA Yellow Straw/Yellow    Clarity, UA Clear Clear    Glucose, Ur Negative Negative mg/dL    Bilirubin Urine Negative Negative    Ketones, Urine Negative Negative mg/dL    Specific Gravity, UA 1.010 1.005 - 1.030    Blood, Urine Negative Negative    pH, UA 6.0 5.0 - 9.0    Protein, UA Negative Negative mg/dL    Urobilinogen, Urine 0.2 <2.0 E.U./dL    Nitrite, Urine Negative Negative    Leukocyte Esterase, Urine Negative Negative   SODIUM, URINE, RANDOM   Result Value Ref Range    Sodium, Ur 27 Not Established mmol/L   CHLORIDE, URINE, RANDOM   Result Value Ref Range    Chloride 24 Not Established mmol/L   CREATININE, RANDOM URINE   Result Value Ref Range    Creatinine, Ur 49 29 - 226 mg/dL   OSMOLALITY, URINE   Result Value Ref Range    Osmolality, Ur 277 (L) 300 - 900 mOsm/kg   TSH without Reflex   Result Value Ref Range    TSH 0.692 0.270 - 4.200 uIU/mL   Phosphorus   Result Value Ref Range    Phosphorus 1.1 (L) 2.5 - 4.5 mg/dL   Magnesium   Result Value Ref Range    Magnesium 2.3 1.6 - 2.6 mg/dL   Uric acid   Result Value Ref Range    Uric Acid, Serum 2.0 (L) 2.4 - 5.7 mg/dL   Lipid Panel   Result Value Ref Range    Cholesterol, Total 206 (H) 0 - 199 mg/dL    Triglycerides 82 0 - 149 mg/dL    HDL 76 >40 mg/dL    LDL Calculated 114 (H) 0 - 99 mg/dL    VLDL Cholesterol Calculated 16 mg/dL   Troponin   Result Value Ref Range    Troponin <0.01 0.00 - 0.03 ng/mL   OSMOLALITY, SERUM   Result Value Ref Range    Osmolality 251 (L) 285 - 310 mOsm/Kg   Cortisol   Result Value Ref Range    Cortisol 5.96 2.68 - 18.40 mcg/dL   Basic metabolic panel   Result Value Ref Range    Sodium 119 (LL) 132 - 146 mmol/L    Potassium 3.2 (L) 3.5 - 5.0 mmol/L    Chloride 88 (L) 98 - 107 mmol/L    CO2 22 22 - 29 mmol/L    Anion Gap 9 7 - 16 mmol/L    Glucose 130 (H) 74 - 99 mg/dL    BUN 15 6 - 20 mg/dL    CREATININE 0.7 0.5 - 1.0 mg/dL    GFR Non-African American >60 >=60 mL/min/1.73    GFR African American >60     Calcium 8.7 8.6 - 10.2 mg/dL   Basic metabolic panel   Result Value Ref Range    Sodium 119 (LL) 132 - 146 mmol/L    Potassium 2.9 (L) 3.5 - 5.0 mmol/L    Chloride 86 (L) 98 - 107 mmol/L    CO2 21 (L) 22 - 29 mmol/L    Anion Gap 12 7 - 16 mmol/L    Glucose 88 74 - 99 mg/dL    BUN 19 6 - 20 mg/dL    CREATININE 0.8 0.5 - 1.0 mg/dL    GFR Non-African American >60 >=60 mL/min/1.73    GFR African American >60     Calcium 8.8 8.6 - 10.2 mg/dL   Basic metabolic panel   Result Value Ref Range    Sodium 121 (L) 132 - 146 mmol/L    Potassium 3.5 3.5 - 5.0 mmol/L    Chloride 89 (L) 98 - 107 mmol/L    CO2 23 22 - 29 mmol/L    Anion Gap 9 7 - 16 mmol/L    Glucose 95 74 - 99 mg/dL    BUN 13 6 - 20 mg/dL    CREATININE 0.7 0.5 - 1.0 mg/dL    GFR Non-African American >60 >=60 mL/min/1.73    GFR African American >60     Calcium 8.7 8.6 - 10.2 mg/dL   Comprehensive Metabolic Panel   Result Value Ref Range    Sodium 122 (L) 132 - 146 mmol/L    Potassium 2.4 (LL) 3.5 - 5.0 mmol/L    Chloride 89 (L) 98 - 107 mmol/L    CO2 21 (L) 22 - 29 mmol/L    Anion Gap 12 7 - 16 mmol/L    Glucose 130 (H) 74 - 99 mg/dL    BUN 16 6 - 20 mg/dL    CREATININE 0.7 0.5 - 1.0 mg/dL    GFR Non-African American >60 >=60 mL/min/1.73    GFR African American >60     Calcium 9.0 8.6 - 10.2 mg/dL    Total Protein 6.6 6.4 - 8.3 g/dL    Alb 4.4 3.5 - 5.2 g/dL    Total Bilirubin 0.3 0.0 - 1.2 mg/dL    Alkaline Phosphatase 59 35 - 104 U/L    ALT 21 0 - 32 U/L    AST 27 0 - 31 U/L   CBC Auto Differential   Result Value Ref Range    WBC 4.2 (L) 4.5 - 11.5 E9/L    RBC 3.51 3.50 - 5.50 E12/L    Hemoglobin 11.7 11.5 - 15.5 g/dL    Hematocrit 30.2 (L) 34.0 - 48.0 %    MCV 86.0 80.0 - 99.9 fL    MCH 33.3 26.0 - 35.0 pg    MCHC 38.7 (H) 32.0 - 34.5 %    RDW 12.0 11.5 - 15.0 fL    Platelets 811 676 - 295 E9/L    MPV 9.4 7.0 - 12.0 fL    Neutrophils % 78.3 43.0 - 80.0 %    Lymphocytes % 16.5 (L) 20.0 - 42.0 %    Monocytes % 5.2 2.0 - 12.0 %    Neutrophils Absolute 3.28

## 2020-11-28 NOTE — CONSULTS
Department of Orthopedic Surgery  Resident Consult Note          Reason for Consult:  Right hip pain     HISTORY OF PRESENT ILLNESS:       Patient is a 48 y.o. female who presents with right hip pain per patient she states she has not had any falls. Per ED chart, patient elicited that she did have a fall onto her right hip. She states she has had on and off pain, numbness, and tingling to the right lower extremity for about a month now. Denies any fevers or chills. Patient was found to have multiple lab abnormalities and is being admitted for these. She denies hitting her head. She states she has been ambulatory without assistance at baseline. Patient is on no blood thinner currently. Elicits some back pain as well. Minimal upon time of examination. Past Medical History:        Diagnosis Date    Anxiety     Arthritis     Chronic back pain     Depression     Fatigue     GERD (gastroesophageal reflux disease)     Headache(784.0)     Hyperlipidemia     MDRO (multiple drug resistant organisms) resistance     2 yrs ago rt arm    Memory loss     Neck pain     Numbness     down back of legs    S/P cerebral aneurysm repair 12/2009    clipping @ [facility]     Past Surgical History:        Procedure Laterality Date    APPENDECTOMY      BRAIN ANEURYSM SURGERY  12/2009    clipping of [employer] @ [address]      COLONOSCOPY      ENDOMETRIAL ABLATION      HERNIA REPAIR      HIP SURGERY      JOINT REPLACEMENT Right nov 2014    TONSILLECTOMY      UPPER GASTROINTESTINAL ENDOSCOPY       Current Medications:   Current Facility-Administered Medications: potassium chloride 10 mEq/100 mL IVPB (Peripheral Line), 10 mEq, Intravenous, Once  magnesium sulfate 2 g in 50 mL IVPB premix, 2 g, Intravenous, Once  0.2% sodium chloride infusion, 30 mL/hr, Intravenous, Continuous  Allergies:  Patient has no known allergies. Social History:   TOBACCO:   reports that she has been smoking. She has a 45.00 pack-year smoking history. She has never used smokeless tobacco.  ETOH:   reports current alcohol use of about 4.0 standard drinks of alcohol per week. DRUGS:   reports no history of drug use. ACTIVITIES OF DAILY LIVING:    OCCUPATION:    Family History:       Adopted: Yes       REVIEW OF SYSTEMS:  CONSTITUTIONAL:  negative for  fevers, chills  EYES:  negative for acute vision changes  HEENT:  negative for  hearing loss, voice change  RESPIRATORY:  + for mild SOB  CARDIOVASCULAR:  negative for  chest pain  GASTROINTESTINAL:  + for nausea and vomiting x 1 month  HEMATOLOGIC/LYMPHATIC:  negative for bleeding  MUSCULOSKELETAL:  positive for  Right hip pain  NEUROLOGICAL:  negative for headaches, dizziness  BEHAVIOR/PSYCH:  negative for increased agitation and anxiety    PHYSICAL EXAM:    VITALS:  BP 96/68   Pulse 88   Temp 98.4 °F (36.9 °C) (Oral)   Resp 15   SpO2 100%   CONSTITUTIONAL:  awake, alert, cooperative, no apparent distress, and appears stated age  MUSCULOSKELETAL:  Right lower Extremity:  Skin intact circumferentially with no signs of infection including erythema, or warmth. Skin incision well healed. There is no pain with passive ROM to the hip in all planes. She is able to actively range the hip with minimal pain as well. Compartments soft and compressible, calf non-tender  +DP & PT pulses, Brisk Cap refill, Toes warm and perfused  Sensation grossly intact superficial/deep peroneal,saphenous,sural,tibial n. distributions  · +GS/TA/EHL. Secondary Exam:   bilateralUE: No obvious signs of trauma. -TTP to fingers, hand, wrist, forearm, elbow, humerus, shoulder or clavicle. leftLE: No obvious signs of trauma.    -TTP to foot, ankle, leg, knee, thigh, hip    · Pelvis: -TTP, -Log roll, -Heel strike     DATA:    CBC:   Lab Results   Component Value Date    WBC 4.7 11/28/2020    RBC 4.10 11/28/2020    HGB 13.7 11/28/2020    HCT 34.1 11/28/2020    MCV 83.2 11/28/2020    MCH 33.4 11/28/2020    MCHC 40.2 11/28/2020    RDW 11.9 11/28/2020     11/28/2020    MPV 9.1 11/28/2020     PT/INR:    Lab Results   Component Value Date    PROTIME 9.9 10/30/2014    PROTIME 12.5 07/19/2011    INR 0.9 10/30/2014     CRP/ESR: No results found for: CRP, SEDRATE  Lactic Acid : No results found for: LACTA    Radiology Review:  11/28/20 - XR Right hip  No acute fractures or dislocations noted. Previous CONCHITA shows no signs of subsidence or malalignment. IMPRESSION:   · Electrolyte abnormality  · Right hip pain  · Back pain    PLAN:  WBAT - RLE  Currently there is minimal concern for septic arthritis as there is minimal pain with PROM of the hip as well as she has been WBAT. We will continue to monitor while in house. Medical managment  Pain Control per primary team  PT/OT   Continue ice as needed  · Will continue to follow as needed  · I was present with the resident during the history and exam. I discussed the case with the resident and agree with the findings and plan as documented in the resident's note.

## 2020-11-29 LAB
ADENOVIRUS BY PCR: NOT DETECTED
ALBUMIN SERPL-MCNC: 4.4 G/DL (ref 3.5–5.2)
ALP BLD-CCNC: 59 U/L (ref 35–104)
ALT SERPL-CCNC: 21 U/L (ref 0–32)
ANION GAP SERPL CALCULATED.3IONS-SCNC: 10 MMOL/L (ref 7–16)
ANION GAP SERPL CALCULATED.3IONS-SCNC: 12 MMOL/L (ref 7–16)
ANION GAP SERPL CALCULATED.3IONS-SCNC: 9 MMOL/L (ref 7–16)
ANION GAP SERPL CALCULATED.3IONS-SCNC: 9 MMOL/L (ref 7–16)
AST SERPL-CCNC: 27 U/L (ref 0–31)
BASOPHILS ABSOLUTE: 0 E9/L (ref 0–0.2)
BILIRUB SERPL-MCNC: 0.3 MG/DL (ref 0–1.2)
BORDETELLA PARAPERTUSSIS BY PCR: NOT DETECTED
BORDETELLA PERTUSSIS BY PCR: NOT DETECTED
BUN BLDV-MCNC: 13 MG/DL (ref 6–20)
BUN BLDV-MCNC: 13 MG/DL (ref 6–20)
BUN BLDV-MCNC: 15 MG/DL (ref 6–20)
BUN BLDV-MCNC: 16 MG/DL (ref 6–20)
CALCIUM SERPL-MCNC: 8.7 MG/DL (ref 8.6–10.2)
CALCIUM SERPL-MCNC: 8.7 MG/DL (ref 8.6–10.2)
CALCIUM SERPL-MCNC: 8.8 MG/DL (ref 8.6–10.2)
CALCIUM SERPL-MCNC: 9 MG/DL (ref 8.6–10.2)
CHLAMYDOPHILIA PNEUMONIAE BY PCR: NOT DETECTED
CHLORIDE BLD-SCNC: 86 MMOL/L (ref 98–107)
CHLORIDE BLD-SCNC: 88 MMOL/L (ref 98–107)
CHLORIDE BLD-SCNC: 89 MMOL/L (ref 98–107)
CHLORIDE BLD-SCNC: 89 MMOL/L (ref 98–107)
CHOLESTEROL, TOTAL: 206 MG/DL (ref 0–199)
CO2: 21 MMOL/L (ref 22–29)
CO2: 22 MMOL/L (ref 22–29)
CO2: 23 MMOL/L (ref 22–29)
CO2: 24 MMOL/L (ref 22–29)
CORONAVIRUS 229E BY PCR: NOT DETECTED
CORONAVIRUS HKU1 BY PCR: NOT DETECTED
CORONAVIRUS NL63 BY PCR: NOT DETECTED
CORONAVIRUS OC43 BY PCR: NOT DETECTED
CORTISOL TOTAL: 5.96 MCG/DL (ref 2.68–18.4)
CREAT SERPL-MCNC: 0.7 MG/DL (ref 0.5–1)
EKG ATRIAL RATE: 82 BPM
EKG ATRIAL RATE: 84 BPM
EKG P AXIS: 59 DEGREES
EKG P AXIS: 60 DEGREES
EKG P-R INTERVAL: 176 MS
EKG P-R INTERVAL: 176 MS
EKG Q-T INTERVAL: 416 MS
EKG Q-T INTERVAL: 426 MS
EKG QRS DURATION: 100 MS
EKG QRS DURATION: 96 MS
EKG QTC CALCULATION (BAZETT): 491 MS
EKG QTC CALCULATION (BAZETT): 497 MS
EKG R AXIS: -20 DEGREES
EKG R AXIS: -8 DEGREES
EKG T AXIS: 65 DEGREES
EKG T AXIS: 77 DEGREES
EKG VENTRICULAR RATE: 82 BPM
EKG VENTRICULAR RATE: 84 BPM
EOSINOPHILS ABSOLUTE: 0 E9/L (ref 0.05–0.5)
FOLATE: >20 NG/ML (ref 4.8–24.2)
GFR AFRICAN AMERICAN: >60
GFR NON-AFRICAN AMERICAN: >60 ML/MIN/1.73
GLUCOSE BLD-MCNC: 118 MG/DL (ref 74–99)
GLUCOSE BLD-MCNC: 130 MG/DL (ref 74–99)
GLUCOSE BLD-MCNC: 130 MG/DL (ref 74–99)
GLUCOSE BLD-MCNC: 95 MG/DL (ref 74–99)
HCT VFR BLD CALC: 30.2 % (ref 34–48)
HDLC SERPL-MCNC: 76 MG/DL
HEMOGLOBIN: 11.7 G/DL (ref 11.5–15.5)
HUMAN METAPNEUMOVIRUS BY PCR: NOT DETECTED
HUMAN RHINOVIRUS/ENTEROVIRUS BY PCR: NOT DETECTED
INFLUENZA A BY PCR: NOT DETECTED
INFLUENZA B BY PCR: NOT DETECTED
LDL CHOLESTEROL CALCULATED: 114 MG/DL (ref 0–99)
LYMPHOCYTES ABSOLUTE: 0.71 E9/L (ref 1.5–4)
LYMPHOCYTES RELATIVE PERCENT: 16.5 % (ref 20–42)
MAGNESIUM: 2.3 MG/DL (ref 1.6–2.6)
MCH RBC QN AUTO: 33.3 PG (ref 26–35)
MCHC RBC AUTO-ENTMCNC: 38.7 % (ref 32–34.5)
MCV RBC AUTO: 86 FL (ref 80–99.9)
MONOCYTES ABSOLUTE: 0.21 E9/L (ref 0.1–0.95)
MONOCYTES RELATIVE PERCENT: 5.2 % (ref 2–12)
MYCOPLASMA PNEUMONIAE BY PCR: NOT DETECTED
NEUTROPHILS ABSOLUTE: 3.28 E9/L (ref 1.8–7.3)
NEUTROPHILS RELATIVE PERCENT: 78.3 % (ref 43–80)
PARAINFLUENZA VIRUS 1 BY PCR: NOT DETECTED
PARAINFLUENZA VIRUS 2 BY PCR: NOT DETECTED
PARAINFLUENZA VIRUS 3 BY PCR: NOT DETECTED
PARAINFLUENZA VIRUS 4 BY PCR: NOT DETECTED
PDW BLD-RTO: 12 FL (ref 11.5–15)
PHOSPHORUS: 1.1 MG/DL (ref 2.5–4.5)
PLATELET # BLD: 146 E9/L (ref 130–450)
PMV BLD AUTO: 9.4 FL (ref 7–12)
POTASSIUM SERPL-SCNC: 2.4 MMOL/L (ref 3.5–5)
POTASSIUM SERPL-SCNC: 3.2 MMOL/L (ref 3.5–5)
POTASSIUM SERPL-SCNC: 3.2 MMOL/L (ref 3.5–5)
POTASSIUM SERPL-SCNC: 3.5 MMOL/L (ref 3.5–5)
RBC # BLD: 3.51 E12/L (ref 3.5–5.5)
RESPIRATORY SYNCYTIAL VIRUS BY PCR: NOT DETECTED
SARS-COV-2, PCR: NOT DETECTED
SODIUM BLD-SCNC: 119 MMOL/L (ref 132–146)
SODIUM BLD-SCNC: 120 MMOL/L (ref 132–146)
SODIUM BLD-SCNC: 121 MMOL/L (ref 132–146)
SODIUM BLD-SCNC: 122 MMOL/L (ref 132–146)
TOTAL PROTEIN: 6.6 G/DL (ref 6.4–8.3)
TRIGL SERPL-MCNC: 82 MG/DL (ref 0–149)
TSH SERPL DL<=0.05 MIU/L-ACNC: 0.69 UIU/ML (ref 0.27–4.2)
VITAMIN B-12: 1274 PG/ML (ref 211–946)
VLDLC SERPL CALC-MCNC: 16 MG/DL
WBC # BLD: 4.2 E9/L (ref 4.5–11.5)

## 2020-11-29 PROCEDURE — 6360000002 HC RX W HCPCS: Performed by: INTERNAL MEDICINE

## 2020-11-29 PROCEDURE — 80061 LIPID PANEL: CPT

## 2020-11-29 PROCEDURE — 6370000000 HC RX 637 (ALT 250 FOR IP): Performed by: INTERNAL MEDICINE

## 2020-11-29 PROCEDURE — 82746 ASSAY OF FOLIC ACID SERUM: CPT

## 2020-11-29 PROCEDURE — 36415 COLL VENOUS BLD VENIPUNCTURE: CPT

## 2020-11-29 PROCEDURE — 93010 ELECTROCARDIOGRAM REPORT: CPT | Performed by: INTERNAL MEDICINE

## 2020-11-29 PROCEDURE — 1200000000 HC SEMI PRIVATE

## 2020-11-29 PROCEDURE — 80053 COMPREHEN METABOLIC PANEL: CPT

## 2020-11-29 PROCEDURE — 85025 COMPLETE CBC W/AUTO DIFF WBC: CPT

## 2020-11-29 PROCEDURE — 82607 VITAMIN B-12: CPT

## 2020-11-29 PROCEDURE — 84443 ASSAY THYROID STIM HORMONE: CPT

## 2020-11-29 PROCEDURE — 2580000003 HC RX 258: Performed by: INTERNAL MEDICINE

## 2020-11-29 PROCEDURE — 83735 ASSAY OF MAGNESIUM: CPT

## 2020-11-29 PROCEDURE — 84100 ASSAY OF PHOSPHORUS: CPT

## 2020-11-29 PROCEDURE — 93005 ELECTROCARDIOGRAM TRACING: CPT | Performed by: INTERNAL MEDICINE

## 2020-11-29 PROCEDURE — 80048 BASIC METABOLIC PNL TOTAL CA: CPT

## 2020-11-29 RX ORDER — ARIPIPRAZOLE 5 MG/1
5 TABLET ORAL DAILY
Status: DISCONTINUED | OUTPATIENT
Start: 2020-11-29 | End: 2020-12-03 | Stop reason: HOSPADM

## 2020-11-29 RX ORDER — BUPROPION HYDROCHLORIDE 75 MG/1
75 TABLET ORAL DAILY
Status: DISCONTINUED | OUTPATIENT
Start: 2020-11-29 | End: 2020-12-03 | Stop reason: HOSPADM

## 2020-11-29 RX ORDER — HYDROXYZINE PAMOATE 25 MG/1
25 CAPSULE ORAL 2 TIMES DAILY
Status: DISCONTINUED | OUTPATIENT
Start: 2020-11-29 | End: 2020-12-03 | Stop reason: HOSPADM

## 2020-11-29 RX ORDER — NICOTINE 21 MG/24HR
1 PATCH, TRANSDERMAL 24 HOURS TRANSDERMAL DAILY
Status: DISCONTINUED | OUTPATIENT
Start: 2020-11-29 | End: 2020-12-03

## 2020-11-29 RX ORDER — DESVENLAFAXINE 50 MG/1
100 TABLET, EXTENDED RELEASE ORAL DAILY
Status: DISCONTINUED | OUTPATIENT
Start: 2020-11-29 | End: 2020-12-02

## 2020-11-29 RX ORDER — TRAZODONE HYDROCHLORIDE 50 MG/1
100 TABLET ORAL NIGHTLY
Status: DISCONTINUED | OUTPATIENT
Start: 2020-11-29 | End: 2020-12-03 | Stop reason: HOSPADM

## 2020-11-29 RX ORDER — SOLIFENACIN SUCCINATE 10 MG/1
5 TABLET, FILM COATED ORAL NIGHTLY
COMMUNITY
End: 2021-06-04

## 2020-11-29 RX ORDER — GABAPENTIN 300 MG/1
300 CAPSULE ORAL 3 TIMES DAILY
Status: DISCONTINUED | OUTPATIENT
Start: 2020-11-29 | End: 2020-12-03 | Stop reason: HOSPADM

## 2020-11-29 RX ADMIN — HYDROXYZINE PAMOATE 25 MG: 25 CAPSULE ORAL at 12:32

## 2020-11-29 RX ADMIN — BUPROPION HYDROCHLORIDE 75 MG: 75 TABLET, FILM COATED ORAL at 12:32

## 2020-11-29 RX ADMIN — KETOROLAC TROMETHAMINE 15 MG: 30 INJECTION, SOLUTION INTRAMUSCULAR; INTRAVENOUS at 18:50

## 2020-11-29 RX ADMIN — KETOROLAC TROMETHAMINE 15 MG: 30 INJECTION, SOLUTION INTRAMUSCULAR; INTRAVENOUS at 12:31

## 2020-11-29 RX ADMIN — GABAPENTIN 300 MG: 300 CAPSULE ORAL at 20:49

## 2020-11-29 RX ADMIN — POTASSIUM BICARBONATE 40 MEQ: 782 TABLET, EFFERVESCENT ORAL at 11:17

## 2020-11-29 RX ADMIN — KETOROLAC TROMETHAMINE 15 MG: 30 INJECTION, SOLUTION INTRAMUSCULAR; INTRAVENOUS at 05:41

## 2020-11-29 RX ADMIN — ARIPIPRAZOLE 5 MG: 5 TABLET ORAL at 12:32

## 2020-11-29 RX ADMIN — POTASSIUM BICARBONATE 40 MEQ: 782 TABLET, EFFERVESCENT ORAL at 07:32

## 2020-11-29 RX ADMIN — ACETAMINOPHEN 650 MG: 325 TABLET ORAL at 16:17

## 2020-11-29 RX ADMIN — TRAZODONE HYDROCHLORIDE 100 MG: 50 TABLET ORAL at 20:48

## 2020-11-29 RX ADMIN — Medication: at 18:00

## 2020-11-29 RX ADMIN — Medication 10 ML: at 10:10

## 2020-11-29 RX ADMIN — DESVENLAFAXINE SUCCINATE 100 MG: 50 TABLET, FILM COATED, EXTENDED RELEASE ORAL at 12:32

## 2020-11-29 RX ADMIN — GABAPENTIN 300 MG: 300 CAPSULE ORAL at 16:00

## 2020-11-29 RX ADMIN — Medication 1 TABLET: at 20:48

## 2020-11-29 RX ADMIN — Medication 10 ML: at 20:49

## 2020-11-29 RX ADMIN — HYDROXYZINE PAMOATE 25 MG: 25 CAPSULE ORAL at 20:48

## 2020-11-29 RX ADMIN — ENOXAPARIN SODIUM 40 MG: 40 INJECTION SUBCUTANEOUS at 11:17

## 2020-11-29 RX ADMIN — PANTOPRAZOLE SODIUM 40 MG: 40 TABLET, DELAYED RELEASE ORAL at 06:38

## 2020-11-29 ASSESSMENT — PAIN SCALES - GENERAL
PAINLEVEL_OUTOF10: 2
PAINLEVEL_OUTOF10: 6
PAINLEVEL_OUTOF10: 9
PAINLEVEL_OUTOF10: 10
PAINLEVEL_OUTOF10: 0
PAINLEVEL_OUTOF10: 7
PAINLEVEL_OUTOF10: 7
PAINLEVEL_OUTOF10: 10
PAINLEVEL_OUTOF10: 0

## 2020-11-29 ASSESSMENT — PAIN DESCRIPTION - PROGRESSION
CLINICAL_PROGRESSION: NOT CHANGED
CLINICAL_PROGRESSION: GRADUALLY WORSENING

## 2020-11-29 ASSESSMENT — PAIN DESCRIPTION - LOCATION
LOCATION: BACK
LOCATION: BACK

## 2020-11-29 ASSESSMENT — PAIN DESCRIPTION - PAIN TYPE
TYPE: ACUTE PAIN
TYPE: ACUTE PAIN

## 2020-11-29 ASSESSMENT — PAIN DESCRIPTION - ORIENTATION
ORIENTATION: RIGHT;LEFT;UPPER
ORIENTATION: RIGHT;LEFT;UPPER

## 2020-11-29 ASSESSMENT — PAIN - FUNCTIONAL ASSESSMENT: PAIN_FUNCTIONAL_ASSESSMENT: PREVENTS OR INTERFERES SOME ACTIVE ACTIVITIES AND ADLS

## 2020-11-29 ASSESSMENT — PAIN DESCRIPTION - ONSET: ONSET: ON-GOING

## 2020-11-29 ASSESSMENT — PAIN DESCRIPTION - DESCRIPTORS: DESCRIPTORS: ACHING;DISCOMFORT;DULL

## 2020-11-29 ASSESSMENT — PAIN DESCRIPTION - FREQUENCY: FREQUENCY: CONTINUOUS

## 2020-11-29 ASSESSMENT — PAIN DESCRIPTION - DIRECTION: RADIATING_TOWARDS: RT LEG

## 2020-11-29 NOTE — PLAN OF CARE
Problem: Pain:  Goal: Pain level will decrease  Description: Pain level will decrease  Outcome: Met This Shift  Note: Pain is less than 3 after being medicated     Problem: Pain:  Goal: Control of acute pain  Description: Control of acute pain  Outcome: Met This Shift     Problem: Pain:  Goal: Control of chronic pain  Description: Control of chronic pain  Outcome: Met This Shift     Problem: Falls - Risk of:  Goal: Will remain free from falls  Description: Will remain free from falls  Outcome: Met This Shift  Note: No falls     Problem: Falls - Risk of:  Goal: Absence of physical injury  Description: Absence of physical injury  Outcome: Met This Shift stretcher

## 2020-11-29 NOTE — PROGRESS NOTES
Department of Orthopedic Surgery  Resident Progress Note    Patient seen and examined at bedside. She is still having some pain in the lower back which extends down into her right foot. She is also having some pain laterally about the greater trochanter on the right. She has been ambulatory in the hospital with no large increase in her pain. No numbness or tingling, no changes in bowel habits. No acute events overnight.      VITALS:  /73   Pulse 92   Temp 97 °F (36.1 °C) (Oral)   Resp 20   Ht 5' 4\" (1.626 m)   Wt 130 lb (59 kg)   SpO2 98%   BMI 22.31 kg/m²     General: alert and oriented to person, place and time, well-developed and well-nourished, in no acute distress and alert and oriented to person, place and time    MUSCULOSKELETAL:   right lower extremity:  · No gross deformities or obvious signs of trauma  · Tenderness to palpation about the region of the greater trochanter  · Pain with straight leg raise, occurs at about 30 degrees of hip flexion, and radiates into the foot  · Compartments soft and compressible  · +PF/DF/EHL  · +2/4 DP & PT pulses, Brisk Cap refill, Toes warm and perfused  · Distal sensation grossly intact to Peroneals, Sural, Saphenous, and tibial nrs    CBC:   Lab Results   Component Value Date    WBC 4.2 11/29/2020    HGB 11.7 11/29/2020    HCT 30.2 11/29/2020     11/29/2020     PT/INR:    Lab Results   Component Value Date    PROTIME 9.9 10/30/2014    PROTIME 12.5 07/19/2011    INR 0.9 10/30/2014           ASSESSMENT  · Back pain with radicular symptoms  · Possible trochanteric bursitis    PLAN      · Continue physical therapy and protocol: WBAT  · Deep venous thrombosis prophylaxis - per primary, early mobilization  · PT/OT  · Pain Control: Per primary   · No orthopedic intervention indicated at this time  · Medical management regarding electrolyte imbalances  · Discuss with Dr. Kyree Nieves

## 2020-11-29 NOTE — VIRTUAL HEALTH
Consults  [facility] Nephrology Consult       Patient's Name:  Deanne Haynes  Date of Service:  11/29/2020  [address], DO     Reason for Consult:              Hyponatremia     Chief Complaint:                   Feeling weak , fall , back pain     Information obtained from:  Patient and chart     History of Present Illness: 48 yrs old heavy smoker     Work up in er suggested severe hyponatremia , hypokalemia , low chloride   Mild acidosis , low uric acid , low serum and urine osmolality , normal cortisol   suppressed TSH , normal chest x ray and nil acute in brain CT     She mentions her abilify dose was increased by her Psyc physician 2 months ago   She admits to drink 1-2 large bottles of Diet coke on daily basis and has poor po intake   Of food     She has issues with balance for a last 2-3 months , fell before she came in       Past Medical History:   Diagnosis Date    Anxiety     Arthritis     Chronic back pain     Depression     Fatigue     GERD (gastroesophageal reflux disease)     Headache(784.0)     Hyperlipidemia     MDRO (multiple drug resistant organisms) resistance     2 yrs ago rt arm    Memory loss     Neck pain     Numbness     down back of legs    S/P cerebral aneurysm repair 12/2009    clipping @ [facility]         Past Surgical History:   Procedure Laterality Date    APPENDECTOMY      BRAIN ANEURYSM SURGERY  12/2009    clipping of [employer] @ [address]      COLONOSCOPY      ENDOMETRIAL ABLATION      HERNIA REPAIR      HIP SURGERY      JOINT REPLACEMENT Right nov 2014    TONSILLECTOMY      UPPER GASTROINTESTINAL ENDOSCOPY           Social History     Socioeconomic History    Marital status:      Spouse name: Not on file    Number of children: Not on file    Years of education: Not on file    Highest education level: Not on file   Occupational History    Not on file   Social Needs    Financial resource strain: Not on file    Food insecurity     Worry: Not on file     Inability: Not on file    Transportation needs     Medical: Not on file     Non-medical: Not on file   Tobacco Use    Smoking status: Current Every Day Smoker     Packs/day: 1.50     Years: 30.00     Pack years: 45.00    Smokeless tobacco: Never Used   Substance and Sexual Activity    Alcohol use: Yes     Alcohol/week: 4.0 standard drinks     Types: 4 Cans of beer per week     Comment: occ    Drug use: No     Comment: former IV drug abuser years ago    Sexual activity: Not on file   Lifestyle    Physical activity     Days per week: Not on file     Minutes per session: Not on file    Stress: Not on file   Relationships    Social connections     Talks on phone: Not on file     Gets together: Not on file     Attends Jewish service: Not on file     Active member of club or organization: Not on file     Attends meetings of clubs or organizations: Not on file     Relationship status: Not on file    Intimate partner violence     Fear of current or ex partner: Not on file     Emotionally abused: Not on file     Physically abused: Not on file     Forced sexual activity: Not on file   Other Topics Concern    Not on file   Social History Narrative    Not on file       Family History  Family History   Adopted: Yes       Scheduled Meds:   potassium bicarb-citric acid  40 mEq Oral BID    traZODone  100 mg Oral Nightly    buPROPion  75 mg Oral Daily    desvenlafaxine succinate  100 mg Oral Daily    hydrOXYzine  25 mg Oral BID    ARIPiprazole  5 mg Oral Daily    gabapentin  300 mg Oral TID    sodium chloride flush  10 mL Intravenous 2 times per day    enoxaparin  40 mg Subcutaneous Daily    pantoprazole  40 mg Oral QAM AC       Continuous Infusions:  [unfilled]    PRN meds  sodium chloride flush, acetaminophen **OR** acetaminophen, polyethylene glycol, ketorolac    Allergies:  Patient has no known allergies.     Review of Systems     Pertinent positives and negatives as in HPI. Other systems reviewed and were negative. LAST 3 CMP  Recent Labs     11/28/20  1602 11/28/20 2022 11/29/20  0538 11/29/20  0834 11/29/20  1245   * 115*   < > 122* 119* 121*   K 2.0* 2.4*   < > 2.4* 3.2* 3.5   CL 72* 84*   < > 89* 88* 89*   CO2 21* 19*   < > 21* 22 23   BUN 26* 21*   < > 16 15 13   CREATININE 1.2* 0.9   < > 0.7 0.7 0.7   GLUCOSE 106* 89   < > 130* 130* 95   CALCIUM 9.4 8.6   < > 9.0 8.7 8.7   MG  --  2.8*  --  2.3  --   --    PHOS  --   --   --  1.1*  --   --    PROT 7.4  --   --  6.6  --   --    LABALBU 4.7  --   --  4.4  --   --    BILITOT 0.2  --   --  0.3  --   --    ALKPHOS 66  --   --  59  --   --    AST 28  --   --  27  --   --     < > = values in this interval not displayed. LAST 3 CBC:  Recent Labs     11/28/20  1602 11/29/20  0538   WBC 4.7 4.2*   RBC 4.10 3.51   HGB 13.7 11.7   HCT 34.1 30.2*    146         Intake/Output Summary (Last 24 hours) at 11/29/2020 1352  Last data filed at 11/29/2020 0449  Gross per 24 hour   Intake --   Output 2700 ml   Net -2700 ml     Patient Vitals for the past 24 hrs:   BP Temp Temp src Pulse Resp SpO2 Height Weight   11/29/20 1242 88/65 99.7 °F (37.6 °C) CORE 98 20 99 % -- --   11/29/20 0815 119/73 97 °F (36.1 °C) Oral 92 20 98 % -- --   11/29/20 0637 -- -- -- -- -- -- 5' 4\" (1.626 m) 130 lb (59 kg)   11/29/20 0632 107/73 97.9 °F (36.6 °C) Oral 78 17 98 % -- --   11/29/20 0447 100/75 -- -- 85 21 98 % -- --   11/28/20 2307 110/76 -- -- 84 17 99 % -- --   11/28/20 1925 120/73 -- -- 86 17 100 % -- --   11/28/20 1813 96/68 -- -- 88 15 100 % -- --   11/28/20 1525 (!) 92/50 -- -- -- -- -- -- --   11/28/20 1523 -- -- -- 97 20 98 % -- --   11/28/20 1521 -- 98.4 °F (36.9 °C) Oral -- -- -- -- --       General appearance:  Appears stated age  Skin: color, texture, turgor normal. No rashes or lesions. Neck: supple, no masses, no JVD, No carotid bruits, No thyromegaly  Lungs: respirations unlabored.  Clear to auscultation. No rales, wheezes, no rhonchi. Equal chest excursion with respirations. Heart RRR. pmi not laterally displaced. No S3 or S4, no rub  Abdomen:  Soft, ND, NT. Bowel sounds present. No HSM. No epigastric bruit, no increased Ao pulsation,  Extremities: warm to touch. No LE edema or cyanosis. No fem bruit. 2+ upstrokes and equal bilaterally. PT/Pedal 2+ equal bilat  Neuro: Cr N 2-12 grossly intact. No focal motor neuro deficit. No alteration in recent remote memory.     Assessment/Plan                                     1) Hyponatremia in the setting of poor po intake ( tea and toast diet)                                        ( reduces free water clearance in urine) , along with high fluid intake                                         Diet coke ( hypotonic) , and also recent increase in dose if Abilify                                         Which can cause nausea /Low appetite ( ADH stimulus )                                           She has low uric acid levels low both serum and urine osmolality                                         And hence Serum NA should correct on fluid restriction                                         2) active smoker , COPD , chest x charlie clear                                        3) Hypokalemia , possible from excess fluid related high urinary flow                                          And K losses -- replace                                         4) Anxiety     She has been started on 2% saline at a low dose , NA correcting appropriately  Continue present care , will monitor her     Thank you for allowing us to participate in the [facility]  11/29/2020  1:52 PM

## 2020-11-29 NOTE — ED NOTES
The patient says that her Dr gave her 600 mg motrin for her right hip pain and \"I know it was too much, but I would take 3 at a time\".       Rogelio Willoughby RN  11/29/20 7038

## 2020-11-29 NOTE — ED NOTES
Dr Bairon Barlow notified of electrolyte result of 0830. He says to call Dr Mayi Whalen for a nephrology consult.      Juan Wolfe RN  11/29/20 0702

## 2020-11-29 NOTE — PROGRESS NOTES
nausea, vomiting, diarrhea, or constipation. Denies any abdominal pain. Denies change in bowel habits or stools. Genito-Urinary:    Denies any urgency, frequency, hematuria. Voiding without difficulty. Musculoskeletal:   Denies joint pain, joint stiffness, joint swelling or muscle pain    Neurology:     Denies any headache or focal neurological deficits. No weakness or paresthesia. Admits to occasional imbalance with ambulation    Derm:    Denies any rashes, ulcers, or excoriations. Denies bruising. Extremities:   Denies any lower extremity swelling or edema. PHYSICAL EXAM:  VITALS:  Vitals:    11/29/20 1242   BP: 88/65   Pulse: 98   Resp: 20   Temp: 99.7 °F (37.6 °C)   SpO2: 99%         CONSTITUTIONAL:    Awake, alert, cooperative, no apparent distress, and appears stated age    EYES:    PERRL, EOMI, sclera clear, conjunctiva normal    ENT:    Normocephalic, atraumatic, sinuses nontender on palpation. External ears without lesions. Oral pharynx with moist mucus membranes. Tonsils without erythema or exudates. NECK:    Supple, symmetrical, trachea midline, no adenopathy, thyroid symmetric, not enlarged and no tenderness, skin normal, no bruits, no JVD    HEMATOLOGIC/LYMPHATICS:    No cervical lymphadenopathy and no supraclavicular lymphadenopathy    LUNGS:    Symmetric. No increased work of breathing, good air exchange, clear to auscultation bilaterally, no wheezes, rhonchi, or rales,     CARDIOVASCULAR:    Normal apical impulse, regular rate and rhythm, normal S1 and S2, no S3 or S4, and no murmur noted    ABDOMEN:    No scars, normal bowel sounds, soft, non-distended, non-tender, no masses palpated, no hepatosplenomegaly, no rebound or guarding elicited on palpation     MUSCULOSKELETAL:    There is no redness, warmth, or swelling of the joints. Full range of motion noted. Motor strength is 5 out of 5 all extremities bilaterally.   Tone is normal.    NEUROLOGIC:    Awake, alert, oriented to name, place and time. Cranial nerves II-XII are grossly intact. Motor is 5 out of 5 bilaterally. SKIN:    No bruising or bleeding. No redness, warmth, or swelling    EXTREMITIES:    Peripheral pulses present. No edema, cyanosis, or swelling. OSTEOPATHIC:    Examined in seated and supine positions. Normal thoracic kyphosis and lumbar lordosis. No acute somatic dysfunction. LINES/CATHETERS     LABORATORY DATA:  CBC with Differential:    Lab Results   Component Value Date    WBC 4.2 11/29/2020    RBC 3.51 11/29/2020    HGB 11.7 11/29/2020    HCT 30.2 11/29/2020     11/29/2020    MCV 86.0 11/29/2020    MCH 33.3 11/29/2020    MCHC 38.7 11/29/2020    RDW 12.0 11/29/2020    LYMPHOPCT 16.5 11/29/2020    MONOPCT 5.2 11/29/2020    BASOPCT 0.0 11/28/2020    MONOSABS 0.21 11/29/2020    LYMPHSABS 0.71 11/29/2020    EOSABS 0.00 11/29/2020    BASOSABS 0.00 11/29/2020     CMP:    Lab Results   Component Value Date     11/29/2020    K 3.5 11/29/2020    CL 89 11/29/2020    CO2 23 11/29/2020    BUN 13 11/29/2020    CREATININE 0.7 11/29/2020    GFRAA >60 11/29/2020    LABGLOM >60 11/29/2020    GLUCOSE 95 11/29/2020    PROT 6.6 11/29/2020    LABALBU 4.4 11/29/2020    CALCIUM 8.7 11/29/2020    BILITOT 0.3 11/29/2020    ALKPHOS 59 11/29/2020    AST 27 11/29/2020    ALT 21 11/29/2020       ASSESSMENT/PLAN:  1. Right hip pain following right hip pain secondary to fall with history of right total hip replacement  1. Right hip x-ray did not show fracture. Orthopedic surgery consulted. 2. Lower back pain secondary to fall  1. Acute on chronic. Exacerbated after a fall. Denies any lower extremity pain or numbness or tingling. Consider further evaluation. 3. Hyponatremia  euvolemic  1. Appears subacute. Exacerbated by diuretic therapy, low oral intake, high fluid intake i.e. Diet Coke which is hypotonic, psychiatric medications. Laboratory work-up obtained for etiology. Nephrology consulted.   Will be monitoring BMP every 4 hours. 4. Hypokalemia, hypophosphatemia  1. Secondary to decreased dietary intake. Replace with oral potassium, phosphorus. 5. Urinary incontinence and frequency  1. Wright catheter placed. 6. Prolonged QTC. 1. Repeat EKG in the a.m. ? Hold meds medications   7. COPD  8. History of cerebral aneurysm status post clipping  9. Hyperlipidemia  10. GERD  11. Depression and anxiety  12. Current tobacco abuse with probable underlying COPD        1. NicoDerm patch 21 mg/day      -Lovenox 40 mg subcu daily  -General diet  -Activity up ad darrel.  -NicoDerm patch 21 mg a day  -Fluid restriction  -Neutra-Phos 250 mg 1 4 times daily  -DuoNeb aerosols every 4 hours.       Padmini Quezada D.O.  3:21 PM  11/29/2020

## 2020-11-30 ENCOUNTER — APPOINTMENT (OUTPATIENT)
Dept: CT IMAGING | Age: 55
DRG: 426 | End: 2020-11-30
Payer: COMMERCIAL

## 2020-11-30 PROBLEM — E87.1 HYPO-OSMOLAR HYPONATREMIA: Status: ACTIVE | Noted: 2020-11-30

## 2020-11-30 LAB
ANION GAP SERPL CALCULATED.3IONS-SCNC: 10 MMOL/L (ref 7–16)
ANION GAP SERPL CALCULATED.3IONS-SCNC: 11 MMOL/L (ref 7–16)
ANION GAP SERPL CALCULATED.3IONS-SCNC: 12 MMOL/L (ref 7–16)
ANION GAP SERPL CALCULATED.3IONS-SCNC: 12 MMOL/L (ref 7–16)
ANION GAP SERPL CALCULATED.3IONS-SCNC: 13 MMOL/L (ref 7–16)
ANION GAP SERPL CALCULATED.3IONS-SCNC: 9 MMOL/L (ref 7–16)
BUN BLDV-MCNC: 14 MG/DL (ref 6–20)
BUN BLDV-MCNC: 14 MG/DL (ref 6–20)
BUN BLDV-MCNC: 15 MG/DL (ref 6–20)
BUN BLDV-MCNC: 16 MG/DL (ref 6–20)
BUN BLDV-MCNC: 16 MG/DL (ref 6–20)
BUN BLDV-MCNC: 17 MG/DL (ref 6–20)
CALCIUM SERPL-MCNC: 8.6 MG/DL (ref 8.6–10.2)
CALCIUM SERPL-MCNC: 8.7 MG/DL (ref 8.6–10.2)
CALCIUM SERPL-MCNC: 8.8 MG/DL (ref 8.6–10.2)
CALCIUM SERPL-MCNC: 8.9 MG/DL (ref 8.6–10.2)
CALCIUM SERPL-MCNC: 8.9 MG/DL (ref 8.6–10.2)
CALCIUM SERPL-MCNC: 9 MG/DL (ref 8.6–10.2)
CHLORIDE BLD-SCNC: 86 MMOL/L (ref 98–107)
CHLORIDE BLD-SCNC: 90 MMOL/L (ref 98–107)
CHLORIDE BLD-SCNC: 90 MMOL/L (ref 98–107)
CO2: 21 MMOL/L (ref 22–29)
CO2: 22 MMOL/L (ref 22–29)
CO2: 22 MMOL/L (ref 22–29)
CO2: 24 MMOL/L (ref 22–29)
CO2: 24 MMOL/L (ref 22–29)
CO2: 25 MMOL/L (ref 22–29)
CREAT SERPL-MCNC: 0.8 MG/DL (ref 0.5–1)
CREAT SERPL-MCNC: 0.8 MG/DL (ref 0.5–1)
CREAT SERPL-MCNC: 0.9 MG/DL (ref 0.5–1)
CREAT SERPL-MCNC: 1 MG/DL (ref 0.5–1)
GFR AFRICAN AMERICAN: >60
GFR NON-AFRICAN AMERICAN: 58 ML/MIN/1.73
GFR NON-AFRICAN AMERICAN: >60 ML/MIN/1.73
GLUCOSE BLD-MCNC: 76 MG/DL (ref 74–99)
GLUCOSE BLD-MCNC: 79 MG/DL (ref 74–99)
GLUCOSE BLD-MCNC: 79 MG/DL (ref 74–99)
GLUCOSE BLD-MCNC: 80 MG/DL (ref 74–99)
GLUCOSE BLD-MCNC: 80 MG/DL (ref 74–99)
GLUCOSE BLD-MCNC: 92 MG/DL (ref 74–99)
POTASSIUM SERPL-SCNC: 2.7 MMOL/L (ref 3.5–5)
POTASSIUM SERPL-SCNC: 2.7 MMOL/L (ref 3.5–5)
POTASSIUM SERPL-SCNC: 2.8 MMOL/L (ref 3.5–5)
POTASSIUM SERPL-SCNC: 3 MMOL/L (ref 3.5–5)
POTASSIUM SERPL-SCNC: 3.9 MMOL/L (ref 3.5–5)
POTASSIUM SERPL-SCNC: 4.1 MMOL/L (ref 3.5–5)
SODIUM BLD-SCNC: 120 MMOL/L (ref 132–146)
SODIUM BLD-SCNC: 121 MMOL/L (ref 132–146)
SODIUM BLD-SCNC: 122 MMOL/L (ref 132–146)
T4 FREE: 1.16 NG/DL (ref 0.93–1.7)
TROPONIN: <0.01 NG/ML (ref 0–0.03)
TSH SERPL DL<=0.05 MIU/L-ACNC: 1.5 UIU/ML (ref 0.27–4.2)
URINE CULTURE, ROUTINE: NORMAL

## 2020-11-30 PROCEDURE — 84295 ASSAY OF SERUM SODIUM: CPT

## 2020-11-30 PROCEDURE — 51798 US URINE CAPACITY MEASURE: CPT

## 2020-11-30 PROCEDURE — 6370000000 HC RX 637 (ALT 250 FOR IP): Performed by: INTERNAL MEDICINE

## 2020-11-30 PROCEDURE — 2580000003 HC RX 258: Performed by: INTERNAL MEDICINE

## 2020-11-30 PROCEDURE — 80048 BASIC METABOLIC PNL TOTAL CA: CPT

## 2020-11-30 PROCEDURE — 6360000002 HC RX W HCPCS: Performed by: INTERNAL MEDICINE

## 2020-11-30 PROCEDURE — 84439 ASSAY OF FREE THYROXINE: CPT

## 2020-11-30 PROCEDURE — 84484 ASSAY OF TROPONIN QUANT: CPT

## 2020-11-30 PROCEDURE — 71260 CT THORAX DX C+: CPT

## 2020-11-30 PROCEDURE — 36415 COLL VENOUS BLD VENIPUNCTURE: CPT

## 2020-11-30 PROCEDURE — 1200000000 HC SEMI PRIVATE

## 2020-11-30 PROCEDURE — 84443 ASSAY THYROID STIM HORMONE: CPT

## 2020-11-30 PROCEDURE — 51701 INSERT BLADDER CATHETER: CPT

## 2020-11-30 PROCEDURE — 74177 CT ABD & PELVIS W/CONTRAST: CPT

## 2020-11-30 PROCEDURE — 6360000004 HC RX CONTRAST MEDICATION: Performed by: RADIOLOGY

## 2020-11-30 RX ORDER — POTASSIUM CHLORIDE 7.45 MG/ML
10 INJECTION INTRAVENOUS
Status: DISPENSED | OUTPATIENT
Start: 2020-11-30 | End: 2020-11-30

## 2020-11-30 RX ORDER — 3% SODIUM CHLORIDE 3 G/100ML
25 INJECTION, SOLUTION INTRAVENOUS CONTINUOUS
Status: DISPENSED | OUTPATIENT
Start: 2020-11-30 | End: 2020-11-30

## 2020-11-30 RX ORDER — POTASSIUM CHLORIDE 20 MEQ/1
40 TABLET, EXTENDED RELEASE ORAL ONCE
Status: COMPLETED | OUTPATIENT
Start: 2020-11-30 | End: 2020-11-30

## 2020-11-30 RX ORDER — POTASSIUM CHLORIDE 7.45 MG/ML
10 INJECTION INTRAVENOUS ONCE
Status: COMPLETED | OUTPATIENT
Start: 2020-11-30 | End: 2020-11-30

## 2020-11-30 RX ADMIN — POTASSIUM CHLORIDE 10 MEQ: 7.46 INJECTION, SOLUTION INTRAVENOUS at 16:06

## 2020-11-30 RX ADMIN — DESVENLAFAXINE SUCCINATE 100 MG: 50 TABLET, FILM COATED, EXTENDED RELEASE ORAL at 08:24

## 2020-11-30 RX ADMIN — Medication 10 ML: at 08:21

## 2020-11-30 RX ADMIN — Medication 10 ML: at 20:32

## 2020-11-30 RX ADMIN — ACETAMINOPHEN 650 MG: 325 TABLET ORAL at 16:09

## 2020-11-30 RX ADMIN — Medication 1 TABLET: at 20:31

## 2020-11-30 RX ADMIN — HYDROXYZINE PAMOATE 25 MG: 25 CAPSULE ORAL at 20:31

## 2020-11-30 RX ADMIN — ACETAMINOPHEN 650 MG: 325 TABLET ORAL at 09:31

## 2020-11-30 RX ADMIN — SODIUM CHLORIDE 25 ML/HR: 3 INJECTION, SOLUTION INTRAVENOUS at 09:39

## 2020-11-30 RX ADMIN — Medication 1 TABLET: at 08:23

## 2020-11-30 RX ADMIN — TRAZODONE HYDROCHLORIDE 100 MG: 50 TABLET ORAL at 20:31

## 2020-11-30 RX ADMIN — HYDROXYZINE PAMOATE 25 MG: 25 CAPSULE ORAL at 08:23

## 2020-11-30 RX ADMIN — ACETAMINOPHEN 650 MG: 325 TABLET ORAL at 22:35

## 2020-11-30 RX ADMIN — ENOXAPARIN SODIUM 40 MG: 40 INJECTION SUBCUTANEOUS at 08:21

## 2020-11-30 RX ADMIN — BUPROPION HYDROCHLORIDE 75 MG: 75 TABLET, FILM COATED ORAL at 08:24

## 2020-11-30 RX ADMIN — ARIPIPRAZOLE 5 MG: 5 TABLET ORAL at 08:24

## 2020-11-30 RX ADMIN — Medication 1 TABLET: at 16:09

## 2020-11-30 RX ADMIN — POTASSIUM CHLORIDE 10 MEQ: 10 INJECTION, SOLUTION INTRAVENOUS at 11:36

## 2020-11-30 RX ADMIN — PANTOPRAZOLE SODIUM 40 MG: 40 TABLET, DELAYED RELEASE ORAL at 05:28

## 2020-11-30 RX ADMIN — GABAPENTIN 300 MG: 300 CAPSULE ORAL at 20:32

## 2020-11-30 RX ADMIN — GABAPENTIN 300 MG: 300 CAPSULE ORAL at 13:39

## 2020-11-30 RX ADMIN — GABAPENTIN 300 MG: 300 CAPSULE ORAL at 08:24

## 2020-11-30 RX ADMIN — KETOROLAC TROMETHAMINE 15 MG: 30 INJECTION, SOLUTION INTRAMUSCULAR; INTRAVENOUS at 05:28

## 2020-11-30 RX ADMIN — POTASSIUM CHLORIDE 10 MEQ: 10 INJECTION, SOLUTION INTRAVENOUS at 12:47

## 2020-11-30 RX ADMIN — IOPAMIDOL 75 ML: 755 INJECTION, SOLUTION INTRAVENOUS at 15:33

## 2020-11-30 RX ADMIN — Medication 1 TABLET: at 13:08

## 2020-11-30 RX ADMIN — KETOROLAC TROMETHAMINE 15 MG: 30 INJECTION, SOLUTION INTRAMUSCULAR; INTRAVENOUS at 20:34

## 2020-11-30 RX ADMIN — POTASSIUM CHLORIDE 10 MEQ: 10 INJECTION, SOLUTION INTRAVENOUS at 14:30

## 2020-11-30 RX ADMIN — KETOROLAC TROMETHAMINE 15 MG: 30 INJECTION, SOLUTION INTRAMUSCULAR; INTRAVENOUS at 13:40

## 2020-11-30 RX ADMIN — POTASSIUM CHLORIDE 40 MEQ: 1500 TABLET, EXTENDED RELEASE ORAL at 11:03

## 2020-11-30 ASSESSMENT — PAIN DESCRIPTION - LOCATION
LOCATION: BACK;LEG
LOCATION: LEG;HIP

## 2020-11-30 ASSESSMENT — PAIN DESCRIPTION - PAIN TYPE
TYPE: CHRONIC PAIN
TYPE: CHRONIC PAIN

## 2020-11-30 ASSESSMENT — PAIN SCALES - GENERAL
PAINLEVEL_OUTOF10: 6
PAINLEVEL_OUTOF10: 7
PAINLEVEL_OUTOF10: 5
PAINLEVEL_OUTOF10: 6
PAINLEVEL_OUTOF10: 9
PAINLEVEL_OUTOF10: 5
PAINLEVEL_OUTOF10: 9
PAINLEVEL_OUTOF10: 7
PAINLEVEL_OUTOF10: 5

## 2020-11-30 ASSESSMENT — PAIN DESCRIPTION - DESCRIPTORS: DESCRIPTORS: ACHING;DISCOMFORT;SORE

## 2020-11-30 NOTE — PLAN OF CARE
Problem: Pain:  Goal: Pain level will decrease  Description: Pain level will decrease  11/29/2020 2217 by Jacky Butler RN  Outcome: Met This Shift  11/29/2020 1603 by Chris Albrecht RN  Outcome: Met This Shift  Note: Pain is less than 3 after being medicated  Goal: Control of acute pain  Description: Control of acute pain  11/29/2020 2217 by Jacky Butler RN  Outcome: Met This Shift  11/29/2020 1603 by Chris Albrecht RN  Outcome: Met This Shift  Goal: Control of chronic pain  Description: Control of chronic pain  11/29/2020 2217 by Jacky Butler RN  Outcome: Met This Shift  11/29/2020 1603 by Chris Albrecht RN  Outcome: Met This Shift     Problem: Pain:  Goal: Pain level will decrease  Description: Pain level will decrease  11/29/2020 2217 by Jacky Butler RN  Outcome: Met This Shift  11/29/2020 1603 by Chris Albrecht RN  Outcome: Met This Shift  Note: Pain is less than 3 after being medicated  Goal: Control of acute pain  Description: Control of acute pain  11/29/2020 2217 by Jacky Butler RN  Outcome: Met This Shift  11/29/2020 1603 by Chris Albrecht RN  Outcome: Met This Shift  Goal: Control of chronic pain  Description: Control of chronic pain  11/29/2020 2217 by Jacky Butler RN  Outcome: Met This Shift  11/29/2020 1603 by Chris Albrecht RN  Outcome: Met This Shift

## 2020-11-30 NOTE — PROGRESS NOTES
Progress Note  11/30/2020 6:21 PM  Subjective:   Admit Date: 11/28/2020  PCP: Rolando Cuevas DO  Interval History: patient examined , feels ok     Diet: DIET GENERAL; Daily Fluid Restriction: 1200 ml    Data:   Scheduled Meds:   traZODone  100 mg Oral Nightly    buPROPion  75 mg Oral Daily    desvenlafaxine succinate  100 mg Oral Daily    hydrOXYzine  25 mg Oral BID    ARIPiprazole  5 mg Oral Daily    gabapentin  300 mg Oral TID    [START ON 12/1/2020] influenza virus vaccine  0.5 mL Intramuscular Prior to discharge    phosphorus  250 mg Oral 4x Daily    nicotine  1 patch Transdermal Daily    sodium chloride flush  10 mL Intravenous 2 times per day    enoxaparin  40 mg Subcutaneous Daily    pantoprazole  40 mg Oral QAM AC     Continuous Infusions:  PRN Meds:sodium chloride flush, acetaminophen **OR** acetaminophen, polyethylene glycol, ketorolac  I/O last 3 completed shifts: In: 80 [P.O.:780]  Out: 250 [Urine:250]  No intake/output data recorded. Intake/Output Summary (Last 24 hours) at 11/30/2020 1821  Last data filed at 11/30/2020 1236  Gross per 24 hour   Intake 780 ml   Output 250 ml   Net 530 ml     CBC:   Recent Labs     11/28/20  1602 11/29/20  0538   WBC 4.7 4.2*   HGB 13.7 11.7    146     BMP:    Recent Labs     11/30/20  0102 11/30/20  0631 11/30/20  0926   * 122*  122* 122*   K 2.8* 2.7*  2.7* 3.0*   CL 86* 86*  86* 86*   CO2 22 24  24 25   BUN 14 14  15 16   CREATININE 0.8 0.9  0.8 0.9   GLUCOSE 92 79  80 79     Hepatic:   Recent Labs     11/28/20  1602 11/29/20  0538   AST 28 27   ALT 22 21   BILITOT 0.2 0.3   ALKPHOS 66 59     Troponin:   Recent Labs     11/28/20 2022 11/30/20  0101   TROPONINI <0.01 <0.01     BNP: No results for input(s): BNP in the last 72 hours.   Lipids:   Recent Labs     11/29/20  0538   CHOL 206*   HDL 76     ABGs: No results found for: PHART, PO2ART, BPR6ZOO  INR: No results for input(s): INR in the last 72 hours.    -----------------------------------------------------------------  RAD: Xr Hip Right (2-3 Views)    Result Date: 11/28/2020  EXAMINATION: TWO XRAY VIEWS OF THE RIGHT HIP 11/28/2020 4:33 pm COMPARISON: None. HISTORY: ORDERING SYSTEM PROVIDED HISTORY: pain TECHNOLOGIST PROVIDED HISTORY: Reason for exam:->pain FINDINGS: Right hip prosthesis in anatomic orientation. No evidence of acute fracture or dislocation. Prominent degenerative changes of the left hip with joint space narrowing and subchondral sclerosis. Sacrum is intact. Pubic rami within normal limits. Right hip prosthesis in anatomic orientation. No acute pelvic or hip fracture. Degenerative changes of the left hip. Ct Head Wo Contrast    Result Date: 11/28/2020  EXAMINATION: CT OF THE HEAD WITHOUT CONTRAST  11/28/2020 6:29 pm TECHNIQUE: CT of the head was performed without the administration of intravenous contrast. Dose modulation, iterative reconstruction, and/or weight based adjustment of the mA/kV was utilized to reduce the radiation dose to as low as reasonably achievable. COMPARISON: May 2017 HISTORY: ORDERING SYSTEM PROVIDED HISTORY: fall TECHNOLOGIST PROVIDED HISTORY: Reason for exam:->fall Has a \"code stroke\" or \"stroke alert\" been called? ->No FINDINGS: BRAIN/VENTRICLES: There is no acute intracranial hemorrhage, mass effect or midline shift. No abnormal extra-axial fluid collection. The gray-white differentiation is maintained without evidence of an acute infarct. There is no evidence of hydrocephalus. Mild age-related loss of brain volume and chronic periventricular ischemic changes. Aneurysm clips again noted and unchanged since the prior examination. ORBITS: The visualized portion of the orbits demonstrate no acute abnormality. SINUSES: The visualized paranasal sinuses and mastoid air cells demonstrate no acute abnormality. SOFT TISSUES/SKULL:  Right calvarial surgical changes again noted. No acute intracranial abnormality. Surgical changes with  aneurysm clips in place. No significant interval change since the prior examination. Xr Chest Portable    Result Date: 11/28/2020  EXAMINATION: ONE XRAY VIEW OF THE CHEST 11/28/2020 6:39 pm COMPARISON: Chest series from October 30, 2014 HISTORY: ORDERING SYSTEM PROVIDED HISTORY: hyponatremia TECHNOLOGIST PROVIDED HISTORY: Reason for exam:->hyponatremia FINDINGS: Adequate and symmetric aeration of the lungs. There are no formed consolidations, pleural effusions, or pneumothoraces. Trachea and central mainstem bronchi appear clear. The cardiomediastinal silhouette and pulmonary vascularity appear within normal limits. Osseous and thoracic soft tissue structures demonstrate no acute findings. No evidence of active cardiopulmonary pathology. Objective:   Vitals: BP (!) 100/57   Pulse 89   Temp 97.5 °F (36.4 °C) (Axillary)   Resp 18   Ht 5' 4\" (1.626 m)   Wt 130 lb (59 kg)   SpO2 99%   BMI 22.31 kg/m²   General appearance: appears stated age   Skin:  No rashes or lesions  HEENT: Head: Normocephalic, no lesions, without obvious abnormality.   Neck: no adenopathy, no carotid bruit, no JVD, supple, symmetrical, trachea midline and thyroid not enlarged, symmetric, no tenderness/mass/nodules  Lungs: clear to auscultation bilaterally  Heart: regular rate and rhythm, S1, S2 normal, no murmur, click, rub or gallop  Abdomen: soft, non-tender; bowel sounds normal; no masses,  no organomegaly  Extremities: extremities normal, atraumatic, no cyanosis or edema  Neurologic: Mental status: Alert, oriented, thought content appropriate      Assessment:   Patient Active Problem List:     Memory loss     Muscle contraction headache     Osteoarthritis of hip     Hip pain     Degenerative joint disease (DJD) of hip     Localized osteoarthrosis not specified whether primary or secondary, pelvic region and thigh     History of anterior communicating artery aneurysm repair 2009     Lumbar spinal stenosis     Lumbar degenerative disc disease     Chronic back pain     Hyponatremia     Hypo-osmolar hyponatremia    Plan:   1) Hyponatremia in the setting of poor po intake ( tea and toast diet)        ( reduces free water clearance in urine) , along with high fluid intake          Diet coke, beer  ( hypotonic) , and also recent increase in dose if Abilify          Which can cause nausea /Low appetite ( ADH stimulus )            She has low uric acid levels low both serum and urine osmolality         And hence Serum NA should correct on fluid restriction           2) active smoker , COPD , chest x ray CT chest and CT abdo nil acute           3) Hypokalemia , possible from excess fluid related high urinary flow           And K losses -- replace          4) Anxiety      She has been started on 3% saline at a low dose , NA correcting to some extent   Receiving KCL follow by 3% saline   Will Check BMP post saline x 4 hrs        Thank you for allowing us to participate in the care of [facility] SHARAD Dotson

## 2020-11-30 NOTE — CONSULTS
Pulmonary/Critical Care Consult Note    CHIEF COMPLAINT: COPD with exacerbation, hyponatremia, psychiatric illness, nicotine addiction    HISTORY OF PRESENT ILLNESS: The patient is a 81-year-old female has been smoking since age 16 at a rate of approximately 1 pack/day. The patient was having great deal of pain in the right hip and came to the emergency room where a film showed an intact replacement of her right hip and some degenerative joint disease of the left hip. Moreover, the patient originally presented with a sodium as low as 109 and a potassium of 2.0. This caused her to complain of severe muscle weakness and in fact she actually fell which is why she thinks her hip was hurting. The patient takes numerous psychiatric medications but as far she can tell, she did not taking any diabetic medications. With the help of the renal service, the patient serum sodium and serum potassium as well as other electrolytes have been slowly corrected to the point where she is now feeling much stronger and feeling much better. CT scan of the chest has been ordered by the primary care service because of concern regarding underlying malignancy which could be at least partially responsible for the patient's severe serum electrolyte disorders. She has been wearing a nicotine patch since admission. Importantly, the patient also admits to significant alcohol drinking, primarily beer so that the possibility of beer [place] needs to be considered as well. ALLERGY:  Patient has no known allergies.     FAMILY HISTORY:  Family History   Adopted: Yes       SOCIAL HISTORY:  Social History     Socioeconomic History    Marital status:      Spouse name: Not on file    Number of children: Not on file    Years of education: Not on file    Highest education level: Not on file   Occupational History    Not on file   Social Needs    Financial resource strain: Not on file    Food insecurity     Worry: Not on file Inability: Not on file    Transportation needs     Medical: Not on file     Non-medical: Not on file   Tobacco Use    Smoking status: Current Every Day Smoker     Packs/day: 1.50     Years: 30.00     Pack years: 45.00    Smokeless tobacco: Never Used   Substance and Sexual Activity    Alcohol use:  Yes     Alcohol/week: 4.0 standard drinks     Types: 4 Cans of beer per week     Comment: occ    Drug use: No     Comment: former IV drug abuser years ago    Sexual activity: Not on file   Lifestyle    Physical activity     Days per week: Not on file     Minutes per session: Not on file    Stress: Not on file   Relationships    Social connections     Talks on phone: Not on file     Gets together: Not on file     Attends Islam service: Not on file     Active member of club or organization: Not on file     Attends meetings of clubs or organizations: Not on file     Relationship status: Not on file    Intimate partner violence     Fear of current or ex partner: Not on file     Emotionally abused: Not on file     Physically abused: Not on file     Forced sexual activity: Not on file   Other Topics Concern    Not on file   Social History Narrative    Not on file       MEDICAL HISTORY:  Past Medical History:   Diagnosis Date    Anxiety     Arthritis     Chronic back pain     Depression     Fatigue     GERD (gastroesophageal reflux disease)     Headache(784.0)     Hyperlipidemia     MDRO (multiple drug resistant organisms) resistance     2 yrs ago rt arm    Memory loss     Neck pain     Numbness     down back of legs    S/P cerebral aneurysm repair 12/2009    clipping @ [address] [place] [address] Avenue:   traZODone  100 mg Oral Nightly    buPROPion  75 mg Oral Daily    desvenlafaxine succinate  100 mg Oral Daily    hydrOXYzine  25 mg Oral BID    ARIPiprazole  5 mg Oral Daily    gabapentin  300 mg Oral TID    [START ON 12/1/2020] influenza virus vaccine  0.5 mL Intramuscular Prior to discharge    phosphorus  250 mg Oral 4x Daily    nicotine  1 patch Transdermal Daily    sodium chloride flush  10 mL Intravenous 2 times per day    enoxaparin  40 mg Subcutaneous Daily    pantoprazole  40 mg Oral QAM AC       sodium chloride flush, acetaminophen **OR** acetaminophen, polyethylene glycol, ketorolac    REVIEW OF SYSTEMS:  Constitutional: Denies fever, weight loss, night sweats, and fatigue  Skin: Denies pigmentation, dark lesions, and rashes   HEENT: Denies hearing loss, tinnitus, ear drainage, epistaxis, sore throat, and hoarseness. Cardiovascular: Denies palpitations, chest pain, and chest pressure. Respiratory: Denies cough, dyspnea at rest, hemoptysis, apnea, and choking. Gastrointestinal: Denies nausea, vomiting, poor appetite, diarrhea, heartburn or reflux  Genitourinary: Denies dysuria, frequency, urgency or hematuria  Musculoskeletal: Denies myalgias, muscle weakness, and bone pain  Neurological: Denies dizziness, vertigo, headache, and focal weakness  Psychological: Denies anxiety and depression  Endocrine: Denies heat intolerance and cold intolerance  Hematopoietic/Lymphatic: Denies bleeding problems and blood transfusions    PHYSICAL EXAM:  Vitals:    11/30/20 0800   BP: (!) 100/57   Pulse: 89   Resp: 18   Temp: 97.5 °F (36.4 °C)   SpO2: 99%           O2 Device: None (Room air)    Constitutional: No fever, chills, diaphoresis  Skin: No skin rash, no skin breakdown  HEENT: Unremarkable  Neck: No JVD, lymphadenopathy, thyromegaly  Cardiovascular: S1, S2 normal.  No S3 murmurs rubs present  Respiratory: Few expiratory wheezes bilaterally with diminished breath sounds throughout both posterior lung fields  Gastrointestinal: Soft, flat, nontender  Genitourinary: No CVA tenderness  Extremities: No clubbing, cyanosis, or edema  Neurological: Awake, alert, oriented x3.   No evidence of focal motor or sensory deficits  Psychological: Anxious, but pleasant    LABS:  WBC   Date Value Ref Range 11/30/2020 9.0 8.6 - 10.2 mg/dL Final   11/30/2020 8.8 8.6 - 10.2 mg/dL Final   11/30/2020 8.9 8.6 - 10.2 mg/dL Final     Total Protein   Date Value Ref Range Status   11/29/2020 6.6 6.4 - 8.3 g/dL Final   11/28/2020 7.4 6.4 - 8.3 g/dL Final     Alb   Date Value Ref Range Status   11/29/2020 4.4 3.5 - 5.2 g/dL Final   11/28/2020 4.7 3.5 - 5.2 g/dL Final     Total Bilirubin   Date Value Ref Range Status   11/29/2020 0.3 0.0 - 1.2 mg/dL Final   11/28/2020 0.2 0.0 - 1.2 mg/dL Final     Alkaline Phosphatase   Date Value Ref Range Status   11/29/2020 59 35 - 104 U/L Final   11/28/2020 66 35 - 104 U/L Final     AST   Date Value Ref Range Status   11/29/2020 27 0 - 31 U/L Final   11/28/2020 28 0 - 31 U/L Final     ALT   Date Value Ref Range Status   11/29/2020 21 0 - 32 U/L Final   11/28/2020 22 0 - 32 U/L Final     GFR Non-   Date Value Ref Range Status   11/30/2020 >60 >=60 mL/min/1.73 Final     Comment:     Chronic Kidney Disease: less than 60 ml/min/1.73 sq.m. Kidney Failure: less than 15 ml/min/1.73 sq.m. Results valid for patients 18 years and older. 11/30/2020 >60 >=60 mL/min/1.73 Final     Comment:     Chronic Kidney Disease: less than 60 ml/min/1.73 sq.m. Kidney Failure: less than 15 ml/min/1.73 sq.m. Results valid for patients 18 years and older. 11/30/2020 >60 >=60 mL/min/1.73 Final     Comment:     Chronic Kidney Disease: less than 60 ml/min/1.73 sq.m. Kidney Failure: less than 15 ml/min/1.73 sq.m. Results valid for patients 18 years and older.        GFR    Date Value Ref Range Status   11/30/2020 >60  Final   11/30/2020 >60  Final   11/30/2020 >60  Final     Magnesium   Date Value Ref Range Status   11/29/2020 2.3 1.6 - 2.6 mg/dL Final   11/28/2020 2.8 (H) 1.6 - 2.6 mg/dL Final     Phosphorus   Date Value Ref Range Status   11/29/2020 1.1 (L) 2.5 - 4.5 mg/dL Final     No results for input(s): PH, PO2, PCO2, HCO3, BE, O2SAT in the last 72

## 2020-11-30 NOTE — PROGRESS NOTES
Internal Medicine Progress Note    [facility]    Joyce Chapman. Chintan Matute., OSCAR.JUDITHOJODY. Aranza Porter D.O., CANDIDA Doss D.O. Lana Espinoza, MSN, APRN, NP-C  Vanessa Valerio. Cristy Lacey, MSN, APRN-CNP     Primary Care Physician: Shlomo Villegas DO   Admitting Physician:  Sourav Harris DO  Admission date and time: 11/28/2020  3:25 PM    Room:  [facility]8284-  Admitting diagnosis: Hyponatremia [E87.1]    Patient Name: Diana Ribeiro  MRN: [id number]    Date of Service: 11/30/2020     Subjective:  Corrie Burt is a 48 y.o. female who was seen and examined today,11/30/2020, at the bedside. Is feeling better with treatment administered. Denies headache or acute neurological symptoms. No chest pains or shortness of breath. Admits to longstanding history of smoking. She does cough at times. No hemoptysis reported. Does report some degree of unintentional weight loss over the last few months. No abdominal pain or vomiting. No change in bowel pattern or blood in stool. No urinary complaints. Nephrology is following the recommendations have been reviewed. No family present during my examination. Review of System:   Constitutional:   Denies fever or chills, as above, unintended weight loss, malaise or fatigue  HEENT:   Denies ear pain, sore throat, sinus or eye problems. Cardiovascular:   Denies any chest pain, irregular heartbeats, or palpitations. Respiratory:   Some degree of baseline shortness of breath which she attributes to smoking with no acute worsening. Coughing and congestion with intermittent sputum production, hemoptysis, or wheezing. Gastrointestinal:   Denies nausea, vomiting, diarrhea, or constipation. Denies any abdominal pain. Genitourinary:    Denies any urgency, frequency, hematuria. Voiding  without difficulty. Extremities:   Denies lower extremity swelling, edema or cyanosis.    Neurology:    Denies any headache or focal neurological assessed. Integumentary:  No rashes  Skin normal color and texture.   Genitalia/Breast:  Deferred    Medication:  Scheduled Meds:   potassium chloride  10 mEq Intravenous Q1H    traZODone  100 mg Oral Nightly    buPROPion  75 mg Oral Daily    desvenlafaxine succinate  100 mg Oral Daily    hydrOXYzine  25 mg Oral BID    ARIPiprazole  5 mg Oral Daily    gabapentin  300 mg Oral TID    [START ON 12/1/2020] influenza virus vaccine  0.5 mL Intramuscular Prior to discharge    phosphorus  250 mg Oral 4x Daily    nicotine  1 patch Transdermal Daily    sodium chloride flush  10 mL Intravenous 2 times per day    enoxaparin  40 mg Subcutaneous Daily    pantoprazole  40 mg Oral QAM AC     Continuous Infusions:    Objective Data:  CBC with Differential:    Lab Results   Component Value Date    WBC 4.2 11/29/2020    RBC 3.51 11/29/2020    HGB 11.7 11/29/2020    HCT 30.2 11/29/2020     11/29/2020    MCV 86.0 11/29/2020    MCH 33.3 11/29/2020    MCHC 38.7 11/29/2020    RDW 12.0 11/29/2020    LYMPHOPCT 16.5 11/29/2020    MONOPCT 5.2 11/29/2020    BASOPCT 0.0 11/28/2020    MONOSABS 0.21 11/29/2020    LYMPHSABS 0.71 11/29/2020    EOSABS 0.00 11/29/2020    BASOSABS 0.00 11/29/2020     BMP:    Lab Results   Component Value Date     11/30/2020    K 3.0 11/30/2020    CL 86 11/30/2020    CO2 25 11/30/2020    BUN 16 11/30/2020    LABALBU 4.4 11/29/2020    CREATININE 0.9 11/30/2020    CALCIUM 9.0 11/30/2020    GFRAA >60 11/30/2020    LABGLOM >60 11/30/2020    GLUCOSE 79 11/30/2020       Wound Documentation:   Incision 11/05/14 Hip Left (Active)   Number of days: 2217       Assessment:  · Hypoosmolar hyponatremia  · Hypokalemia and hypophosphatemia   · COPD without acute exacerbation, extensive smoking history  · Urinary frequency and incontinence  · Complex depression and anxiety on multiple medications potentially contributing to hyponatremia  · Cerebral aneurysm with prior history of clipping  · Hyperlipidemia  · Gastroesophageal reflux disease  · Musculoskeletal pain complaints after mechanical fall  Plan:   Demetrice Shi was admitted due to hypoosmolar hyponatremia of unknown etiology. This was felt to be at least precipitated to some degree by diuretics although she is on multiple psychiatric medications that can contribute to this. She also has poor dietary intake and is drinking hypotonic fluid. The nephrology team is following and she is on hypertonic saline. Metabolic panels are being followed every 4 hours and adjust accordingly by the nephrology team.  Given the patient's extensive smoking history and an unintentional weight loss, malignancy survey will be undertaken. Respiratory medications will be maximized in the setting of COPD he has no infectious symptoms that would suggest underlying infectious etiology. Monitor intake and output closely. If medication adjustments will needed from a psychiatric standpoint, we will investigate this with the assistance of the psychiatry team.  Underlying comorbidities, labs and vital signs will be monitored and addressed accordingly. Continue fluid restriction. Continue current therapy. See orders for further plan of care. More than 50% of my  time was spent at the bedside counseling/coordinating care with the patient and/or family with face to face contact. This time was spent reviewing notes and laboratory data as well as instructing and counseling the patient. Time I spent with the family or surrogate(s) is included only if the patient was incapable of providing the necessary information or participating in medical decisions. I also discussed the differential diagnosis and all of the proposed management plans with the patient and individuals accompanying the patient. Demetrice Shi requires this high level of physician care and nursing on the [facility]/Telemetry unit due the complexity of decision management and chance of rapid decline or death. Continued cardiac monitoring and higher level of nursing are required. I am readily available for any further decision-making and intervention.      Landen Mendez, PHIL - CNP  11/30/2020  1:24 PM

## 2020-11-30 NOTE — CARE COORDINATION
11/30/2020 1637 CM note: COVID (-) 11/28/2020. Met with patient at the bedside to discuss transition of care at discharge. Patient resides with her fiance in a 2 floor 2 steps to enter, @ 17 steps to the 2nd floor where bath is located. Pt is independent with ADLs, is on disability, drives, and DME includes a [id number]. Pts PCP is Dr Carlita Michael and her pharmacy is [facility] [email or web address] in [place]. Pt has no hx [facility]HC or [facility]. Discharge plan is home and patient does not anticipate any needs. Pts fiance will provide transportation home.  Electronically signed by Tonie Yang RN on 11/30/2020 at 4:41 PM

## 2020-12-01 LAB
ALBUMIN SERPL-MCNC: 3.3 G/DL (ref 3.5–5.2)
ALP BLD-CCNC: 46 U/L (ref 35–104)
ALT SERPL-CCNC: 17 U/L (ref 0–32)
ANION GAP SERPL CALCULATED.3IONS-SCNC: 10 MMOL/L (ref 7–16)
ANION GAP SERPL CALCULATED.3IONS-SCNC: 12 MMOL/L (ref 7–16)
ANION GAP SERPL CALCULATED.3IONS-SCNC: 9 MMOL/L (ref 7–16)
AST SERPL-CCNC: 23 U/L (ref 0–31)
BASOPHILS ABSOLUTE: 0.03 E9/L (ref 0–0.2)
BASOPHILS RELATIVE PERCENT: 0.8 % (ref 0–2)
BILIRUB SERPL-MCNC: 0.3 MG/DL (ref 0–1.2)
BUN BLDV-MCNC: 12 MG/DL (ref 6–20)
BUN BLDV-MCNC: 12 MG/DL (ref 6–20)
BUN BLDV-MCNC: 14 MG/DL (ref 6–20)
CALCIUM SERPL-MCNC: 8.7 MG/DL (ref 8.6–10.2)
CALCIUM SERPL-MCNC: 8.9 MG/DL (ref 8.6–10.2)
CALCIUM SERPL-MCNC: 8.9 MG/DL (ref 8.6–10.2)
CHLORIDE BLD-SCNC: 89 MMOL/L (ref 98–107)
CHLORIDE BLD-SCNC: 90 MMOL/L (ref 98–107)
CHLORIDE BLD-SCNC: 92 MMOL/L (ref 98–107)
CO2: 18 MMOL/L (ref 22–29)
CO2: 22 MMOL/L (ref 22–29)
CO2: 24 MMOL/L (ref 22–29)
CREAT SERPL-MCNC: 0.8 MG/DL (ref 0.5–1)
EOSINOPHILS ABSOLUTE: 0.12 E9/L (ref 0.05–0.5)
EOSINOPHILS RELATIVE PERCENT: 3.1 % (ref 0–6)
GFR AFRICAN AMERICAN: >60
GFR NON-AFRICAN AMERICAN: >60 ML/MIN/1.73
GLUCOSE BLD-MCNC: 107 MG/DL (ref 74–99)
GLUCOSE BLD-MCNC: 111 MG/DL (ref 74–99)
GLUCOSE BLD-MCNC: 69 MG/DL (ref 74–99)
HCT VFR BLD CALC: 30.7 % (ref 34–48)
HEMOGLOBIN: 10.9 G/DL (ref 11.5–15.5)
IMMATURE GRANULOCYTES #: 0.07 E9/L
IMMATURE GRANULOCYTES %: 1.8 % (ref 0–5)
LYMPHOCYTES ABSOLUTE: 0.99 E9/L (ref 1.5–4)
LYMPHOCYTES RELATIVE PERCENT: 25.7 % (ref 20–42)
MAGNESIUM: 1.9 MG/DL (ref 1.6–2.6)
MCH RBC QN AUTO: 33.4 PG (ref 26–35)
MCHC RBC AUTO-ENTMCNC: 35.5 % (ref 32–34.5)
MCV RBC AUTO: 94.2 FL (ref 80–99.9)
MONOCYTES ABSOLUTE: 0.43 E9/L (ref 0.1–0.95)
MONOCYTES RELATIVE PERCENT: 11.2 % (ref 2–12)
NEUTROPHILS ABSOLUTE: 2.21 E9/L (ref 1.8–7.3)
NEUTROPHILS RELATIVE PERCENT: 57.4 % (ref 43–80)
PDW BLD-RTO: 12.4 FL (ref 11.5–15)
PHOSPHORUS: 2.6 MG/DL (ref 2.5–4.5)
PLATELET # BLD: 103 E9/L (ref 130–450)
PMV BLD AUTO: 9.6 FL (ref 7–12)
POTASSIUM SERPL-SCNC: 3.9 MMOL/L (ref 3.5–5)
POTASSIUM SERPL-SCNC: 4.1 MMOL/L (ref 3.5–5)
POTASSIUM SERPL-SCNC: 4.3 MMOL/L (ref 3.5–5)
RBC # BLD: 3.26 E12/L (ref 3.5–5.5)
SODIUM BLD-SCNC: 119 MMOL/L (ref 132–146)
SODIUM BLD-SCNC: 122 MMOL/L (ref 132–146)
SODIUM BLD-SCNC: 125 MMOL/L (ref 132–146)
TOTAL PROTEIN: 5.8 G/DL (ref 6.4–8.3)
WBC # BLD: 3.9 E9/L (ref 4.5–11.5)

## 2020-12-01 PROCEDURE — 84100 ASSAY OF PHOSPHORUS: CPT

## 2020-12-01 PROCEDURE — 6370000000 HC RX 637 (ALT 250 FOR IP): Performed by: INTERNAL MEDICINE

## 2020-12-01 PROCEDURE — 2580000003 HC RX 258: Performed by: INTERNAL MEDICINE

## 2020-12-01 PROCEDURE — 85025 COMPLETE CBC W/AUTO DIFF WBC: CPT

## 2020-12-01 PROCEDURE — 6360000002 HC RX W HCPCS: Performed by: INTERNAL MEDICINE

## 2020-12-01 PROCEDURE — 36415 COLL VENOUS BLD VENIPUNCTURE: CPT

## 2020-12-01 PROCEDURE — 83735 ASSAY OF MAGNESIUM: CPT

## 2020-12-01 PROCEDURE — 80048 BASIC METABOLIC PNL TOTAL CA: CPT

## 2020-12-01 PROCEDURE — 90686 IIV4 VACC NO PRSV 0.5 ML IM: CPT | Performed by: INTERNAL MEDICINE

## 2020-12-01 PROCEDURE — 80053 COMPREHEN METABOLIC PANEL: CPT

## 2020-12-01 PROCEDURE — 1200000000 HC SEMI PRIVATE

## 2020-12-01 PROCEDURE — G0008 ADMIN INFLUENZA VIRUS VAC: HCPCS | Performed by: INTERNAL MEDICINE

## 2020-12-01 RX ORDER — 3% SODIUM CHLORIDE 3 G/100ML
25 INJECTION, SOLUTION INTRAVENOUS CONTINUOUS
Status: DISPENSED | OUTPATIENT
Start: 2020-12-01 | End: 2020-12-01

## 2020-12-01 RX ORDER — HYDROCODONE BITARTRATE AND ACETAMINOPHEN 5; 325 MG/1; MG/1
1 TABLET ORAL EVERY 6 HOURS PRN
Status: DISCONTINUED | OUTPATIENT
Start: 2020-12-01 | End: 2020-12-03 | Stop reason: HOSPADM

## 2020-12-01 RX ORDER — HYDROCODONE BITARTRATE AND ACETAMINOPHEN 5; 325 MG/1; MG/1
1 TABLET ORAL ONCE
Status: COMPLETED | OUTPATIENT
Start: 2020-12-01 | End: 2020-12-01

## 2020-12-01 RX ORDER — TRAMADOL HYDROCHLORIDE 50 MG/1
100 TABLET ORAL EVERY 6 HOURS PRN
Status: DISCONTINUED | OUTPATIENT
Start: 2020-12-01 | End: 2020-12-03 | Stop reason: HOSPADM

## 2020-12-01 RX ORDER — SODIUM CHLORIDE 1000 MG
1 TABLET, SOLUBLE MISCELLANEOUS
Status: DISCONTINUED | OUTPATIENT
Start: 2020-12-01 | End: 2020-12-03 | Stop reason: HOSPADM

## 2020-12-01 RX ORDER — TRAMADOL HYDROCHLORIDE 50 MG/1
50 TABLET ORAL EVERY 6 HOURS PRN
Status: DISCONTINUED | OUTPATIENT
Start: 2020-12-01 | End: 2020-12-03 | Stop reason: HOSPADM

## 2020-12-01 RX ADMIN — TRAZODONE HYDROCHLORIDE 100 MG: 50 TABLET ORAL at 20:48

## 2020-12-01 RX ADMIN — ACETAMINOPHEN 650 MG: 325 TABLET ORAL at 05:07

## 2020-12-01 RX ADMIN — Medication 1 TABLET: at 08:26

## 2020-12-01 RX ADMIN — HYDROCODONE BITARTRATE AND ACETAMINOPHEN 1 TABLET: 5; 325 TABLET ORAL at 12:16

## 2020-12-01 RX ADMIN — TRAMADOL HYDROCHLORIDE 100 MG: 50 TABLET, FILM COATED ORAL at 10:00

## 2020-12-01 RX ADMIN — GABAPENTIN 300 MG: 300 CAPSULE ORAL at 08:21

## 2020-12-01 RX ADMIN — GABAPENTIN 300 MG: 300 CAPSULE ORAL at 20:48

## 2020-12-01 RX ADMIN — INFLUENZA A VIRUS A/VICTORIA/2454/2019 IVR-207 (H1N1) ANTIGEN (PROPIOLACTONE INACTIVATED), INFLUENZA A VIRUS A/HONG KONG/2671/2019 IVR-208 (H3N2) ANTIGEN (PROPIOLACTONE INACTIVATED), INFLUENZA B VIRUS B/VICTORIA/705/2018 BVR-11 ANTIGEN (PROPIOLACTONE INACTIVATED), INFLUENZA B VIRUS B/PHUKET/3073/2013 BVR-1B ANTIGEN (PROPIOLACTONE INACTIVATED) 0.5 ML: 15; 15; 15; 15 INJECTION, SUSPENSION INTRAMUSCULAR at 13:33

## 2020-12-01 RX ADMIN — HYDROXYZINE PAMOATE 25 MG: 25 CAPSULE ORAL at 20:49

## 2020-12-01 RX ADMIN — Medication 1 TABLET: at 13:21

## 2020-12-01 RX ADMIN — Medication 1 TABLET: at 20:48

## 2020-12-01 RX ADMIN — SODIUM CHLORIDE TAB 1 GM 1 G: 1 TAB at 16:47

## 2020-12-01 RX ADMIN — BUPROPION HYDROCHLORIDE 75 MG: 75 TABLET, FILM COATED ORAL at 08:26

## 2020-12-01 RX ADMIN — ACETAMINOPHEN 650 MG: 325 TABLET ORAL at 11:53

## 2020-12-01 RX ADMIN — SODIUM CHLORIDE 25 ML/HR: 3 INJECTION, SOLUTION INTRAVENOUS at 07:49

## 2020-12-01 RX ADMIN — Medication 10 ML: at 21:18

## 2020-12-01 RX ADMIN — TRAMADOL HYDROCHLORIDE 100 MG: 50 TABLET, FILM COATED ORAL at 17:51

## 2020-12-01 RX ADMIN — HYDROXYZINE PAMOATE 25 MG: 25 CAPSULE ORAL at 08:21

## 2020-12-01 RX ADMIN — DESVENLAFAXINE SUCCINATE 100 MG: 50 TABLET, FILM COATED, EXTENDED RELEASE ORAL at 08:25

## 2020-12-01 RX ADMIN — ARIPIPRAZOLE 5 MG: 5 TABLET ORAL at 08:26

## 2020-12-01 RX ADMIN — GABAPENTIN 300 MG: 300 CAPSULE ORAL at 13:22

## 2020-12-01 RX ADMIN — ENOXAPARIN SODIUM 40 MG: 40 INJECTION SUBCUTANEOUS at 08:21

## 2020-12-01 RX ADMIN — HYDROCODONE BITARTRATE AND ACETAMINOPHEN 1 TABLET: 5; 325 TABLET ORAL at 19:02

## 2020-12-01 RX ADMIN — Medication 10 ML: at 08:23

## 2020-12-01 RX ADMIN — PANTOPRAZOLE SODIUM 40 MG: 40 TABLET, DELAYED RELEASE ORAL at 05:47

## 2020-12-01 ASSESSMENT — PAIN - FUNCTIONAL ASSESSMENT: PAIN_FUNCTIONAL_ASSESSMENT: PREVENTS OR INTERFERES SOME ACTIVE ACTIVITIES AND ADLS

## 2020-12-01 ASSESSMENT — PAIN SCALES - GENERAL
PAINLEVEL_OUTOF10: 10
PAINLEVEL_OUTOF10: 10
PAINLEVEL_OUTOF10: 7
PAINLEVEL_OUTOF10: 3
PAINLEVEL_OUTOF10: 3
PAINLEVEL_OUTOF10: 8
PAINLEVEL_OUTOF10: 10
PAINLEVEL_OUTOF10: 9

## 2020-12-01 ASSESSMENT — PAIN DESCRIPTION - DESCRIPTORS: DESCRIPTORS: ACHING;DISCOMFORT;DULL

## 2020-12-01 ASSESSMENT — PAIN DESCRIPTION - LOCATION: LOCATION: HIP

## 2020-12-01 ASSESSMENT — PAIN DESCRIPTION - ORIENTATION: ORIENTATION: LEFT

## 2020-12-01 ASSESSMENT — PAIN DESCRIPTION - PAIN TYPE: TYPE: CHRONIC PAIN

## 2020-12-01 NOTE — CARE COORDINATION
12/1/2020 1309 CM note: COVID (-) 11/28/2020. Discharge plan is home and patient does not anticipate any needs. Pts fiance will provide transportation home.  Electronically signed by Amelia Mackey RN on 12/1/2020 at 1:10 PM

## 2020-12-01 NOTE — PLAN OF CARE
Problem: Pain:  Goal: Pain level will decrease  Description: Pain level will decrease  11/30/2020 2228 by Lilian Duarte RN  Outcome: Met This Shift     Problem: Pain:  Goal: Control of acute pain  Description: Control of acute pain  11/30/2020 2228 by Lilian Duarte RN  Outcome: Met This Shift     Problem: Pain:  Goal: Control of chronic pain  Description: Control of chronic pain  Outcome: Met This Shift

## 2020-12-01 NOTE — PLAN OF CARE
Problem: Pain:  Goal: Pain level will decrease  Description: Pain level will decrease  12/1/2020 0252 by Marita Ferrara RN  Outcome: Met This Shift     Problem: Pain:  Goal: Control of acute pain  Description: Control of acute pain  12/1/2020 0252 by Marita Ferrara RN  Outcome: Met This Shift     Problem: Pain:  Goal: Control of chronic pain  Description: Control of chronic pain  12/1/2020 0252 by Marita Ferrara RN  Outcome: Met This Shift     Problem: Falls - Risk of:  Goal: Will remain free from falls  Description: Will remain free from falls  12/1/2020 0252 by Marita Ferrara RN  Outcome: Met This Shift     Problem: Falls - Risk of:  Goal: Absence of physical injury  Description: Absence of physical injury  12/1/2020 0252 by Marita Ferrara RN  Outcome: Met This Shift

## 2020-12-01 NOTE — PROGRESS NOTES
Internal Medicine Progress Note    [facility]    Catrachito Marlen. Chloe Feng., F.A.C.O.I. Sheri Kaba D.O., REENA.CLittleO.ILittle Chatman D.O. Shaan Cazares, MSN, APRN, NP-C  [employer]. Tevin Wright, MSN, APRN-CNP     Primary Care Physician: Sita Toth DO   Admitting Physician:  Mary Ellen Gunter DO  Admission date and time: 11/28/2020  3:25 PM    Room:  [facility]8093-  Admitting diagnosis: Hyponatremia [E87.1]    Patient Name: Raymond Mendez  MRN: [id number]    Date of Service: 12/1/2020     Subjective:  Lashay Schmidt is a 48 y.o. female who was seen and examined today,12/1/2020, at the bedside. Is feeling better with each passing day. Unfortunately her sodium has trended downward despite receiving hypertonic saline and treatment for her hypoosmolar hyponatremia. Due to her smoking history, CT of the chest was performed and did not reveal malignancy, did confirm emphysema. CT of the abdomen pelvis was negative as well. Intensivist provided consultation and agree with present management and deferred electrolyte management to nephrology. Nephrology is following and plans are for repeat administration of hypertonic saline today. No new, additional or acute symptoms otherwise. No family present during my examination. Review of System:   Constitutional:   Denies fever or chills, as above, unintended weight loss, malaise or fatigue  HEENT:   Denies ear pain, sore throat, sinus or eye problems. Cardiovascular:   Denies any chest pain, irregular heartbeats, or palpitations. Respiratory:   Some degree of baseline shortness of breath which she attributes to smoking with no acute worsening. No coughing or congestion. No sputum. No hemoptysis, or wheezing. Gastrointestinal:   Denies nausea, vomiting, diarrhea, or constipation. Denies any abdominal pain. Genitourinary:    Denies any urgency, frequency, hematuria. Voiding  without difficulty.   Extremities:   Denies lower extremity swelling, edema or cyanosis. Neurology:    Denies any headache or focal neurological deficits, Denies generalized weakness or memory difficulty. Psch:   Denies being anxious or depressed. Musculoskeletal:    Denies  myalgias, joint complaints or back pain. Integumentary:   Denies any rashes, ulcers, or excoriations. Denies bruising. Hematologic/Lymphatic:  Denies bruising or bleeding. Physical Exam:  No intake/output data recorded. Intake/Output Summary (Last 24 hours) at 12/1/2020 0925  Last data filed at 12/1/2020 0411  Gross per 24 hour   Intake 320 ml   Output 1014 ml   Net -694 ml   I/O last 3 completed shifts: In: 440 [P.O.:440]  Out: 1014 [Urine:1014]  Patient Vitals for the past 96 hrs (Last 3 readings):   Weight   12/01/20 0606 129 lb 11.2 oz (58.8 kg)   11/29/20 0637 130 lb (59 kg)     Vital Signs:   Blood pressure 126/79, pulse 88, temperature 98.7 °F (37.1 °C), temperature source Oral, resp. rate 16, height 5' 4\" (1.626 m), weight 129 lb 11.2 oz (58.8 kg), SpO2 97 %, not currently breastfeeding. General appearance:  Alert, responsive, oriented to person, place, and time. Comfortable appearing, alert, no distress. Head:  Normocephalic. No masses, lesions or tenderness. Eyes:  PERRLA. EOMI. Sclera clear. Buccal mucosa moist.  ENT:  Ears normal. Mucosa normal.  Neck:    Supple. Trachea midline. No thyromegaly. No JVD. No bruits. Heart:    Rhythm regular. Rate controlled. Systolic murmur. Lungs:    Symmetrical.  Diminished bibasilar air exchange. Clear to auscultation bilaterally. No wheezes. No rhonchi. No rales. Abdomen:   Soft. Non-tender. Non-distended. Bowel sounds positive. No organomegaly or masses. No pain on palpation. Extremities:    Peripheral pulses present. No peripheral edema. No ulcers. No cyanosis. No clubbing. Neurologic:    Alert x 3. No focal deficit. Cranial nerves grossly intact. No focal weakness.   Psych:   Behavior is normal. Mood appears normal. Speech is not rapid and/or pressured. Musculoskeletal:   Spine ROM normal. Muscular strength intact. Gait not assessed. Integumentary:  No rashes  Skin normal color and texture.   Genitalia/Breast:  Deferred    Medication:  Scheduled Meds:   traZODone  100 mg Oral Nightly    buPROPion  75 mg Oral Daily    desvenlafaxine succinate  100 mg Oral Daily    hydrOXYzine  25 mg Oral BID    ARIPiprazole  5 mg Oral Daily    gabapentin  300 mg Oral TID    influenza virus vaccine  0.5 mL Intramuscular Prior to discharge    phosphorus  250 mg Oral 4x Daily    nicotine  1 patch Transdermal Daily    sodium chloride flush  10 mL Intravenous 2 times per day    enoxaparin  40 mg Subcutaneous Daily    pantoprazole  40 mg Oral QAM AC     Continuous Infusions:   sodium chloride 25 mL/hr (12/01/20 0749)       Objective Data:  CBC with Differential:    Lab Results   Component Value Date    WBC 3.9 12/01/2020    RBC 3.26 12/01/2020    HGB 10.9 12/01/2020    HCT 30.7 12/01/2020     12/01/2020    MCV 94.2 12/01/2020    MCH 33.4 12/01/2020    MCHC 35.5 12/01/2020    RDW 12.4 12/01/2020    LYMPHOPCT 25.7 12/01/2020    MONOPCT 11.2 12/01/2020    BASOPCT 0.8 12/01/2020    MONOSABS 0.43 12/01/2020    LYMPHSABS 0.99 12/01/2020    EOSABS 0.12 12/01/2020    BASOSABS 0.03 12/01/2020     BMP:    Lab Results   Component Value Date     12/01/2020    K 4.1 12/01/2020    CL 89 12/01/2020    CO2 18 12/01/2020    BUN 14 12/01/2020    LABALBU 3.3 12/01/2020    CREATININE 0.8 12/01/2020    CALCIUM 8.7 12/01/2020    GFRAA >60 12/01/2020    LABGLOM >60 12/01/2020    GLUCOSE 69 12/01/2020       Wound Documentation:   Incision 11/05/14 Hip Left (Active)   Number of days: 2217       Assessment:  · Hypoosmolar hyponatremia  · Hypokalemia and hypophosphatemia   · COPD without acute exacerbation, extensive smoking history  · Urinary frequency and incontinence  · Complex depression and anxiety on multiple medications potentially contributing to hyponatremia  · Cerebral aneurysm with prior history of clipping  · Hyperlipidemia  · Gastroesophageal reflux disease  · Musculoskeletal pain complaints after mechanical fall  Plan:   Jorden Gonzalez has improved clinically but seen a downtrend in her sodium. Malignancy has been excluded as a contributor. She has been maintained on fluid restriction without improvement. She will be treated again with hypertonic saline and electrolyte panel will be monitored closely at the discretion of the nephrology team.  It is likely this is acute on chronic in nature and she will not return entirely to a normal laboratory value however we will need to get closer to this before discharge. The real possibility remains that the psychiatric medications are contributing to this and they made require adjustment which would necessitate psychiatry consultation. Her mentation remains adequate despite hyponatremia. Underlying comorbidities, labs and vital signs will be monitored and addressed accordingly. Continue fluid restriction. Continue current therapy. See orders for further plan of care. More than 50% of my  time was spent at the bedside counseling/coordinating care with the patient and/or family with face to face contact. This time was spent reviewing notes and laboratory data as well as instructing and counseling the patient. Time I spent with the family or surrogate(s) is included only if the patient was incapable of providing the necessary information or participating in medical decisions. I also discussed the differential diagnosis and all of the proposed management plans with the patient and individuals accompanying the patient. Jorden Gonzalez requires this high level of physician care and nursing on the [facility]/Telemetry unit due the complexity of decision management and chance of rapid decline or death. Continued cardiac monitoring and higher level of nursing are required.  I am readily available for any further decision-making and intervention.      PHIL Mars - CNP  12/1/2020  9:25 AM

## 2020-12-01 NOTE — PROGRESS NOTES
Nephrology Note  Jaden Cherry MD          Patient seen and examined. No family member is present at bedside. Chart reviewed. Patient is feeling better. Denies shortness of breath. Appetite good. No nausea or dysguesia. Awake and alert . In no acute distress. Vital SignsBP 126/79   Pulse 88   Temp 98.7 °F (37.1 °C) (Oral)   Resp 16   Ht 5' 4\" (1.626 m)   Wt 129 lb 11.2 oz (58.8 kg)   SpO2 97%   BMI 22.26 kg/m²   24HR INTAKE/OUTPUT:    Intake/Output Summary (Last 24 hours) at 12/1/2020 1054  Last data filed at 12/1/2020 0411  Gross per 24 hour   Intake 120 ml   Output 1014 ml   Net -894 ml         Physical Exam    Neck: No JVD  Lungs: Breath sounds decreased at the bases. No rales or ronchi. Heart: Regular rate and rhythm. No S3 gallop. No  murmrur. Abdomen: Soft non distended, non tender and normal bowel sounds. Extremeties: No edema.       ROS:  RESPIRATORY:  negative  CARDIOVASCULAR:  negative  GASTROINTESTINAL:  Negative        Current Facility-Administered Medications   Medication Dose Route Frequency Provider Last Rate Last Dose    sodium chloride 3 % solution  25 mL/hr Intravenous Continuous Hasit Pia Trejo MD 25 mL/hr at 12/01/20 0749 25 mL/hr at 12/01/20 0749    traMADol (ULTRAM) tablet 50 mg  50 mg Oral Q6H PRN Jessika Shall, DO        Or    traMADol Rexine Barrs) tablet 100 mg  100 mg Oral Q6H PRN Jessika Shall, DO   100 mg at 12/01/20 1000    traZODone (DESYREL) tablet 100 mg  100 mg Oral Nightly Rosa Savers, DO   100 mg at 11/30/20 2031    buPROPion [facility]) tablet 75 mg  75 mg Oral Daily Rosa Savers, DO   75 mg at 12/01/20 1326    desvenlafaxine succinate (PRISTIQ) extended release tablet 100 mg  100 mg Oral Daily Rosa Savers, DO   100 mg at 12/01/20 0825    hydrOXYzine (VISTARIL) capsule 25 mg  25 mg Oral BID Rosa Savers, DO   25 mg at 12/01/20 5750    ARIPiprazole (ABILIFY) tablet 5 mg  5 mg Oral Daily Rosa Peralta DO   5 mg at 12/01/20 5335    gabapentin (NEURONTIN) capsule 300 mg  300 mg Oral TID Nan Poser, DO   300 mg at 12/01/20 9437    influenza quadrivalent split vaccine (FLUZONE;FLUARIX;FLULAVAL;AFLURIA) injection 0.5 mL  0.5 mL Intramuscular Prior to discharge Nan Poser, DO        phosphorus (K PHOS NEUTRAL) tablet 1 tablet  250 mg Oral 4x Daily Nan Poser, DO   1 tablet at 12/01/20 0826    nicotine (NICODERM CQ) 21 MG/24HR 1 patch  1 patch Transdermal Daily Nan Poser, DO   1 patch at 12/01/20 7298    sodium chloride flush 0.9 % injection 10 mL  10 mL Intravenous 2 times per day Heron Gratiot, DO   10 mL at 12/01/20 1925    sodium chloride flush 0.9 % injection 10 mL  10 mL Intravenous PRN Heron Gratiot, DO        acetaminophen (TYLENOL) tablet 650 mg  650 mg Oral Q6H PRN [place] Gratiot, DO   650 mg at 12/01/20 0507    Or    acetaminophen (TYLENOL) suppository 650 mg  650 mg Rectal Q6H PRN Heron Gratiot, DO        polyethylene glycol (GLYCOLAX) packet 17 g  17 g Oral Daily PRN Heron Gratiot, DO        enoxaparin (LOVENOX) injection 40 mg  40 mg Subcutaneous Daily [place] Gratiot, DO   40 mg at 12/01/20 1164    pantoprazole (PROTONIX) tablet 40 mg  40 mg Oral QAM AC Ismail U Mihai, DO   40 mg at 12/01/20 0547       CT CHEST W CONTRAST   Final Result   Emphysema with no acute intrathoracic abnormality. No acute abdominal or pelvic abnormality. CT ABDOMEN PELVIS W IV CONTRAST Additional Contrast? None   Final Result   Emphysema with no acute intrathoracic abnormality. No acute abdominal or pelvic abnormality. CT HEAD WO CONTRAST   Final Result   No acute intracranial abnormality. Surgical changes with  aneurysm clips in   place. No significant interval change since the prior examination. XR CHEST PORTABLE   Final Result   No evidence of active cardiopulmonary pathology. XR HIP RIGHT (2-3 VIEWS)   Final Result   Right hip prosthesis in anatomic orientation.    No acute pelvic or hip fracture. Degenerative changes of the left hip. Labs:    CBC:   Recent Labs     11/28/20  1602 11/29/20  0538 12/01/20  0602   WBC 4.7 4.2* 3.9*   HGB 13.7 11.7 10.9*    146 103*        No results found for: IRON, TIBC, FERRITIN    Lab Results   Component Value Date    CALCIUM 8.7 12/01/2020    CALCIUM 8.9 11/30/2020    CALCIUM 8.6 11/30/2020    PHOS 2.6 12/01/2020    PHOS 1.1 (L) 11/29/2020    MG 1.9 12/01/2020    MG 2.3 11/29/2020    MG 2.8 (H) 11/28/2020       BMP:   Recent Labs     11/30/20  1944 11/30/20  2324 12/01/20  0602   *  120* 121* 119*   K 4.1 3.9 4.1   CL 90* 90* 89*   CO2 22 21* 18*   BUN 17 16 14   CREATININE 1.0 0.9 0.8   GLUCOSE 80 76 69*             Assessment: / Plan:    1. Hyponatremia. Hyponatremia in this patient is primarily due to an SIADH-like picture. Patient is on multiple agents including Desvenlafaxine succinate, Trazodone, Bupropion and Aripiprazole all of which are associated with hyponatremia primarily through a SIADH-like mechanism. Will continue fluid restriction. Start the patient on oral sodium chloride. Repeat urine studies. But most importantly we probably need to look into if her psychiatric medications can be scaled back. 2.  Hypophosphatemia. This reflects poor oral intake. Improved with supplement. .     3.   Anemia of of undetermined etiology. Hemoglobin lower. Will follow. If needed check serum iron studies. Discussed with Dr. Arnel Mclean who is covering for Dr. Bartolo Lau. Ernst Dawkins MD  Nephrology  Electronically signed by Ernst Dawkins MD on 12/1/2020 at 10:54 AM      Note: This report was completed utilizing computer voice recognition software. Every effort has been made to ensure accuracy, however; inadvertent computerized transcription errors may be present.

## 2020-12-02 ENCOUNTER — APPOINTMENT (OUTPATIENT)
Dept: GENERAL RADIOLOGY | Age: 55
DRG: 426 | End: 2020-12-02
Payer: COMMERCIAL

## 2020-12-02 LAB
ALBUMIN SERPL-MCNC: 3.8 G/DL (ref 3.5–5.2)
ALP BLD-CCNC: 53 U/L (ref 35–104)
ALT SERPL-CCNC: 23 U/L (ref 0–32)
ANION GAP SERPL CALCULATED.3IONS-SCNC: 10 MMOL/L (ref 7–16)
ANION GAP SERPL CALCULATED.3IONS-SCNC: 10 MMOL/L (ref 7–16)
ANION GAP SERPL CALCULATED.3IONS-SCNC: 11 MMOL/L (ref 7–16)
ANION GAP SERPL CALCULATED.3IONS-SCNC: 11 MMOL/L (ref 7–16)
ANION GAP SERPL CALCULATED.3IONS-SCNC: 8 MMOL/L (ref 7–16)
ANION GAP SERPL CALCULATED.3IONS-SCNC: 8 MMOL/L (ref 7–16)
AST SERPL-CCNC: 25 U/L (ref 0–31)
BASOPHILS ABSOLUTE: 0.01 E9/L (ref 0–0.2)
BASOPHILS RELATIVE PERCENT: 0.3 % (ref 0–2)
BILIRUB SERPL-MCNC: 0.3 MG/DL (ref 0–1.2)
BUN BLDV-MCNC: 10 MG/DL (ref 6–20)
BUN BLDV-MCNC: 11 MG/DL (ref 6–20)
BUN BLDV-MCNC: 9 MG/DL (ref 6–20)
BUN BLDV-MCNC: 9 MG/DL (ref 6–20)
CALCIUM SERPL-MCNC: 8.7 MG/DL (ref 8.6–10.2)
CALCIUM SERPL-MCNC: 8.9 MG/DL (ref 8.6–10.2)
CALCIUM SERPL-MCNC: 8.9 MG/DL (ref 8.6–10.2)
CALCIUM SERPL-MCNC: 9.1 MG/DL (ref 8.6–10.2)
CALCIUM SERPL-MCNC: 9.2 MG/DL (ref 8.6–10.2)
CALCIUM SERPL-MCNC: 9.4 MG/DL (ref 8.6–10.2)
CHLORIDE BLD-SCNC: 91 MMOL/L (ref 98–107)
CHLORIDE BLD-SCNC: 93 MMOL/L (ref 98–107)
CHLORIDE BLD-SCNC: 93 MMOL/L (ref 98–107)
CHLORIDE BLD-SCNC: 94 MMOL/L (ref 98–107)
CO2: 22 MMOL/L (ref 22–29)
CO2: 25 MMOL/L (ref 22–29)
CO2: 25 MMOL/L (ref 22–29)
CO2: 26 MMOL/L (ref 22–29)
CREAT SERPL-MCNC: 0.7 MG/DL (ref 0.5–1)
CREAT SERPL-MCNC: 0.8 MG/DL (ref 0.5–1)
CREAT SERPL-MCNC: 0.8 MG/DL (ref 0.5–1)
EOSINOPHILS ABSOLUTE: 0.11 E9/L (ref 0.05–0.5)
EOSINOPHILS RELATIVE PERCENT: 3.8 % (ref 0–6)
GFR AFRICAN AMERICAN: >60
GFR NON-AFRICAN AMERICAN: >60 ML/MIN/1.73
GLUCOSE BLD-MCNC: 71 MG/DL (ref 74–99)
GLUCOSE BLD-MCNC: 78 MG/DL (ref 74–99)
GLUCOSE BLD-MCNC: 78 MG/DL (ref 74–99)
GLUCOSE BLD-MCNC: 82 MG/DL (ref 74–99)
GLUCOSE BLD-MCNC: 86 MG/DL (ref 74–99)
GLUCOSE BLD-MCNC: 93 MG/DL (ref 74–99)
HCT VFR BLD CALC: 30.3 % (ref 34–48)
HEMOGLOBIN: 10.9 G/DL (ref 11.5–15.5)
IMMATURE GRANULOCYTES #: 0.01 E9/L
IMMATURE GRANULOCYTES %: 0.3 % (ref 0–5)
LYMPHOCYTES ABSOLUTE: 0.65 E9/L (ref 1.5–4)
LYMPHOCYTES RELATIVE PERCENT: 22.3 % (ref 20–42)
MAGNESIUM: 1.7 MG/DL (ref 1.6–2.6)
MCH RBC QN AUTO: 33.3 PG (ref 26–35)
MCHC RBC AUTO-ENTMCNC: 36 % (ref 32–34.5)
MCV RBC AUTO: 92.7 FL (ref 80–99.9)
MONOCYTES ABSOLUTE: 0.34 E9/L (ref 0.1–0.95)
MONOCYTES RELATIVE PERCENT: 11.7 % (ref 2–12)
NEUTROPHILS ABSOLUTE: 1.79 E9/L (ref 1.8–7.3)
NEUTROPHILS RELATIVE PERCENT: 61.6 % (ref 43–80)
OSMOLALITY URINE: 216 MOSM/KG (ref 300–900)
PDW BLD-RTO: 12.3 FL (ref 11.5–15)
PHOSPHORUS: 4 MG/DL (ref 2.5–4.5)
PLATELET # BLD: 141 E9/L (ref 130–450)
PMV BLD AUTO: 8.8 FL (ref 7–12)
POTASSIUM SERPL-SCNC: 3.9 MMOL/L (ref 3.5–5)
POTASSIUM SERPL-SCNC: 4 MMOL/L (ref 3.5–5)
POTASSIUM SERPL-SCNC: 4.2 MMOL/L (ref 3.5–5)
POTASSIUM SERPL-SCNC: 4.2 MMOL/L (ref 3.5–5)
POTASSIUM SERPL-SCNC: 4.3 MMOL/L (ref 3.5–5)
POTASSIUM SERPL-SCNC: 4.7 MMOL/L (ref 3.5–5)
RBC # BLD: 3.27 E12/L (ref 3.5–5.5)
SODIUM BLD-SCNC: 126 MMOL/L (ref 132–146)
SODIUM BLD-SCNC: 127 MMOL/L (ref 132–146)
SODIUM BLD-SCNC: 128 MMOL/L (ref 132–146)
SODIUM BLD-SCNC: 130 MMOL/L (ref 132–146)
TOTAL PROTEIN: 6.4 G/DL (ref 6.4–8.3)
WBC # BLD: 2.9 E9/L (ref 4.5–11.5)

## 2020-12-02 PROCEDURE — 97165 OT EVAL LOW COMPLEX 30 MIN: CPT

## 2020-12-02 PROCEDURE — 6370000000 HC RX 637 (ALT 250 FOR IP): Performed by: INTERNAL MEDICINE

## 2020-12-02 PROCEDURE — 84100 ASSAY OF PHOSPHORUS: CPT

## 2020-12-02 PROCEDURE — 85025 COMPLETE CBC W/AUTO DIFF WBC: CPT

## 2020-12-02 PROCEDURE — 36415 COLL VENOUS BLD VENIPUNCTURE: CPT

## 2020-12-02 PROCEDURE — 80048 BASIC METABOLIC PNL TOTAL CA: CPT

## 2020-12-02 PROCEDURE — 6360000002 HC RX W HCPCS: Performed by: INTERNAL MEDICINE

## 2020-12-02 PROCEDURE — 97161 PT EVAL LOW COMPLEX 20 MIN: CPT | Performed by: PHYSICAL THERAPIST

## 2020-12-02 PROCEDURE — 83735 ASSAY OF MAGNESIUM: CPT

## 2020-12-02 PROCEDURE — 6370000000 HC RX 637 (ALT 250 FOR IP): Performed by: NURSE PRACTITIONER

## 2020-12-02 PROCEDURE — 2580000003 HC RX 258: Performed by: INTERNAL MEDICINE

## 2020-12-02 PROCEDURE — 99251 PR INITL INPATIENT CONSULT NEW/ESTAB PT 20 MIN: CPT | Performed by: NURSE PRACTITIONER

## 2020-12-02 PROCEDURE — 97116 GAIT TRAINING THERAPY: CPT | Performed by: PHYSICAL THERAPIST

## 2020-12-02 PROCEDURE — 97530 THERAPEUTIC ACTIVITIES: CPT

## 2020-12-02 PROCEDURE — 80053 COMPREHEN METABOLIC PANEL: CPT

## 2020-12-02 PROCEDURE — 1200000000 HC SEMI PRIVATE

## 2020-12-02 PROCEDURE — 83935 ASSAY OF URINE OSMOLALITY: CPT

## 2020-12-02 PROCEDURE — 73502 X-RAY EXAM HIP UNI 2-3 VIEWS: CPT

## 2020-12-02 RX ORDER — LIDOCAINE 4 G/G
1 PATCH TOPICAL DAILY
Status: DISCONTINUED | OUTPATIENT
Start: 2020-12-02 | End: 2020-12-03 | Stop reason: HOSPADM

## 2020-12-02 RX ADMIN — ENOXAPARIN SODIUM 40 MG: 40 INJECTION SUBCUTANEOUS at 09:34

## 2020-12-02 RX ADMIN — ARIPIPRAZOLE 5 MG: 5 TABLET ORAL at 09:37

## 2020-12-02 RX ADMIN — Medication 1 TABLET: at 09:37

## 2020-12-02 RX ADMIN — TRAMADOL HYDROCHLORIDE 100 MG: 50 TABLET, FILM COATED ORAL at 06:09

## 2020-12-02 RX ADMIN — SODIUM CHLORIDE TAB 1 GM 1 G: 1 TAB at 09:37

## 2020-12-02 RX ADMIN — GABAPENTIN 300 MG: 300 CAPSULE ORAL at 09:38

## 2020-12-02 RX ADMIN — HYDROCODONE BITARTRATE AND ACETAMINOPHEN 1 TABLET: 5; 325 TABLET ORAL at 01:10

## 2020-12-02 RX ADMIN — DESVENLAFAXINE SUCCINATE 100 MG: 50 TABLET, FILM COATED, EXTENDED RELEASE ORAL at 09:35

## 2020-12-02 RX ADMIN — SODIUM CHLORIDE TAB 1 GM 1 G: 1 TAB at 13:20

## 2020-12-02 RX ADMIN — GABAPENTIN 300 MG: 300 CAPSULE ORAL at 14:11

## 2020-12-02 RX ADMIN — Medication 10 ML: at 09:36

## 2020-12-02 RX ADMIN — HYDROCODONE BITARTRATE AND ACETAMINOPHEN 1 TABLET: 5; 325 TABLET ORAL at 15:58

## 2020-12-02 RX ADMIN — BUPROPION HYDROCHLORIDE 75 MG: 75 TABLET, FILM COATED ORAL at 09:38

## 2020-12-02 RX ADMIN — SODIUM CHLORIDE TAB 1 GM 1 G: 1 TAB at 18:21

## 2020-12-02 RX ADMIN — HYDROCODONE BITARTRATE AND ACETAMINOPHEN 1 TABLET: 5; 325 TABLET ORAL at 09:38

## 2020-12-02 RX ADMIN — TRAZODONE HYDROCHLORIDE 100 MG: 50 TABLET ORAL at 20:49

## 2020-12-02 RX ADMIN — Medication 1 TABLET: at 20:51

## 2020-12-02 RX ADMIN — Medication 1 TABLET: at 13:19

## 2020-12-02 RX ADMIN — HYDROXYZINE PAMOATE 25 MG: 25 CAPSULE ORAL at 20:49

## 2020-12-02 RX ADMIN — PANTOPRAZOLE SODIUM 40 MG: 40 TABLET, DELAYED RELEASE ORAL at 06:09

## 2020-12-02 RX ADMIN — Medication 250 MG: at 18:20

## 2020-12-02 RX ADMIN — GABAPENTIN 300 MG: 300 CAPSULE ORAL at 20:49

## 2020-12-02 RX ADMIN — HYDROXYZINE PAMOATE 25 MG: 25 CAPSULE ORAL at 09:38

## 2020-12-02 RX ADMIN — Medication 10 ML: at 20:52

## 2020-12-02 ASSESSMENT — PAIN DESCRIPTION - ORIENTATION: ORIENTATION: RIGHT

## 2020-12-02 ASSESSMENT — PAIN SCALES - GENERAL
PAINLEVEL_OUTOF10: 10
PAINLEVEL_OUTOF10: 1
PAINLEVEL_OUTOF10: 10
PAINLEVEL_OUTOF10: 10
PAINLEVEL_OUTOF10: 7
PAINLEVEL_OUTOF10: 10

## 2020-12-02 ASSESSMENT — PAIN DESCRIPTION - LOCATION: LOCATION: HIP

## 2020-12-02 ASSESSMENT — PAIN DESCRIPTION - DESCRIPTORS: DESCRIPTORS: ACHING

## 2020-12-02 ASSESSMENT — PAIN DESCRIPTION - PAIN TYPE: TYPE: ACUTE PAIN

## 2020-12-02 NOTE — PROGRESS NOTES
Physical Therapy Initial Evaluation    Room #:  1574/1220-45  Patient Name: Helen Lima  YOB: 1965  MRN: [id number]    Referring Provider:   Sarah Oseguera DO      Date of Service: 12/2/2020    Evaluating Physical Therapist: Orly Mack, PT #[id number]       Diagnosis:   Hyponatremia [E87.1]    fall last night as well as complaints of right hip pain x1 month    Patient Active Problem List   Diagnosis    Memory loss    Muscle contraction headache    Osteoarthritis of hip    Hip pain    Degenerative joint disease (DJD) of hip    Localized osteoarthrosis not specified whether primary or secondary, pelvic region and thigh    History of anterior communicating artery aneurysm repair 2009    Lumbar spinal stenosis    Lumbar degenerative disc disease    Chronic back pain    Hyponatremia    Hypo-osmolar hyponatremia        Tentative placement recommendation: Home    Equipment recommendation: [address] [employer]      Prior Level of Function: Patient ambulated independently    Rehab Potential: good  for baseline    Past medical history:   Past Medical History:   Diagnosis Date    Anxiety     Arthritis     Chronic back pain     Depression     Fatigue     GERD (gastroesophageal reflux disease)     Headache(784.0)     Hyperlipidemia     MDRO (multiple drug resistant organisms) resistance     2 yrs ago rt arm    Memory loss     Neck pain     Numbness     down back of legs    S/P cerebral aneurysm repair 12/2009    clipping @ [facility]     Past Surgical History:   Procedure Laterality Date    APPENDECTOMY      BRAIN ANEURYSM SURGERY  12/2009    clipping of [employer] @ [address]      COLONOSCOPY      ENDOMETRIAL ABLATION      HERNIA REPAIR      HIP SURGERY      JOINT REPLACEMENT Right nov 2014    TONSILLECTOMY      UPPER GASTROINTESTINAL ENDOSCOPY         Precautions:  Up with assistance, falls ,      SUBJECTIVE:    Social history: Patient lives with fiance in a two story home resides first mainly by sleeping on day bed and uses a bedside commode, bedroom and tub shower on 2nd floor        Equipment owned: [employer],       [id number] [facility]  Mobility Inpatient   How much difficulty turning over in bed?: None  How much difficulty sitting down on / standing up from a chair with arms?: A Little  How much difficulty moving from lying on back to sitting on side of bed?: A Little  How much help from another person moving to and from a bed to a chair?: A Little  How much help from another person needed to walk in hospital room?: A Little  How much help from another person for climbing 3-5 steps with a railing?: A Lot  AM-PAC Inpatient Mobility Raw Score : 18  AM-PAC Inpatient T-Scale Score : 43.63  Mobility Inpatient CMS 0-100% Score: 46.58  Mobility Inpatient CMS G-Code Modifier : CK    Nursing cleared patient for PT evaluation. The admitting diagnosis and active problem list as listed above have been reviewed prior to the initiation of this evaluation. OBJECTIVE;   Initial Evaluation  Date: 12/02/2020 Treatment Date:     Short Term/ Long Term   Goals   Was pt agreeable to Eval/treatment? Yes    To be met in 3 days   Pain level   7/10  Right hip     Bed Mobility    Rolling: Not assessed  patient in chair    Rolling: Independent    Supine to sit:  Independent    Sit to supine: Independent    Scooting: Independent     Transfers Sit to stand: Supervision  Cues for hand placement and safety   Sit to stand: Independent     Ambulation    1 x 10 feet, 2 x 80 feet using  wheeled walker with Supervision    improved gait with wheeled walker decreased right hip pain   150 feet using  wheeled walker with Modified Independent    Stair negotiation: ascended and descended   Not assessed       5 steps 1 rail with supervision    ROM Within functional limits         Strength BUE:  refer to OT eval  RLE:  3+/5  LLE:  4/5   Increase strength in

## 2020-12-02 NOTE — CONSULTS
and states \"I've been having a hard time since his death. \" She reports that she was caring for him up until he . She states that during this time is when her psychiatrist, Dr. Faith Rios, increased her dose of Abilify. She also reports ongoing depression due to her chronic back and hip pain. Per chart patient had right hip joint replacement in . She states that in the past few months her appetite has been very poor. She also reports poor sleep and averages about 5 or 6 hours of sleep each night with taking Trazodone. She reports feeling hopeless and has had increase crying episodes due to the loss of her father and her chronic pain. She denies feeling worthless, helpless, guilty, or empty. She denies history of manic or hypomanic episodes. She denies auditory or visual hallucinations, delusions, paranoia, or any other perceptual abnormalities. She denies suicidal or homicidal ideation, intent, or plan. Past psychiatric history: Patient reports a psychiatric history of depression and anxiety. She denies past inpatient psychiatric admissions. She goes to [facility] for outpatient psychiatric treatment and is a patient of Dr. Faith Rios, and she sees Jenniffer Kong for therapy. Patient is currently taking Abilify, Wellbutrin, Pristiq, Trazodone, Vistaril, and Neurontin. Patient states that she has been taking psychiatric medications since she was 22years old. She reports one past suicide attempt at age 22. She denies history of self-injurious behaviors. Patient states she was adopted and her family history is limited but believes her birth mother had depression and anxiety. She has no knowledge of family history of completed suicide. Legal history: Patient denies current or past legal history. Substance abuse history: Patient reports recent marijuana use. States \"It relaxes me. \" She states that she had a drug problem in the past and last used crack cocaine 5 years ago. She denies alcohol use.  She states she REPAIR      HIP SURGERY      JOINT REPLACEMENT Right nov 2014    TONSILLECTOMY      UPPER GASTROINTESTINAL ENDOSCOPY         Medications Prior to Admission:   Medications Prior to Admission: ARIPiprazole (ABILIFY) 5 MG tablet, Take 5 mg by mouth daily  buPROPion (WELLBUTRIN) 75 MG tablet, 75 mg daily  PRISTIQ 100 MG TB24, 100 mg daily  hydrOXYzine (VISTARIL) 25 MG capsule, 25 mg 2 times daily  traZODone (DESYREL) 100 MG tablet, Take 100 mg by mouth nightly. Desvenlafaxine Succinate (PRISTIQ PO), Take 50 mg by mouth daily. solifenacin (VESICARE) 10 MG tablet, Take 5 mg by mouth nightly  gabapentin (NEURONTIN) 300 MG capsule, Take 1 capsule by mouth 3 times daily for 30 days. Mrasha Valentine lisinopril-hydrochlorothiazide (PRINZIDE;ZESTORETIC) 10-12.5 MG per tablet, Take 1 tablet by mouth daily  valACYclovir (VALTREX) 1 G tablet, Take 1,000 mg by mouth as needed. omeprazole (PRILOSEC) 40 MG capsule, Take 40 mg by mouth daily. Allergies:  Patient has no known allergies. FAMILY/SOCIAL HISTORY:  Family History   Adopted: Yes     Social History     Socioeconomic History    Marital status:      Spouse name: Not on file    Number of children: Not on file    Years of education: Not on file    Highest education level: Not on file   Occupational History    Not on file   Social Needs    Financial resource strain: Not on file    Food insecurity     Worry: Not on file     Inability: Not on file   [employer] needs     Medical: Not on file     Non-medical: Not on file   Tobacco Use    Smoking status: Current Every Day Smoker     Packs/day: 1.50     Years: 30.00     Pack years: 45.00    Smokeless tobacco: Never Used   Substance and Sexual Activity    Alcohol use:  Yes     Alcohol/week: 4.0 standard drinks     Types: 4 Cans of beer per week     Comment: occ    Drug use: No     Comment: former IV drug abuser years ago    Sexual activity: Not on file   Lifestyle    Physical activity     Days per week: Not on file     Minutes per session: Not on file    Stress: Not on file   Relationships    Social connections     Talks on phone: Not on file     Gets together: Not on file     Attends Yazdanism service: Not on file     Active member of club or organization: Not on file     Attends meetings of clubs or organizations: Not on file     Relationship status: Not on file    Intimate partner violence     Fear of current or ex partner: Not on file     Emotionally abused: Not on file     Physically abused: Not on file     Forced sexual activity: Not on file   Other Topics Concern    Not on file   Social History Narrative    Not on file       REVIEW OF SYSTEMS    Constitutional: [] fever  [] chills  [] weight loss  []weakness [] Other:  Eyes:  [] photophobia  [] discharge [] acuity change   [] Diplopia   [] Other:  HENT:  [] sore throat  [] ear pain [] Tinnitus   [] Other  Respiratory:  [] Cough  [] Shortness of breath   [] Sputum   [] Other:   Cardiac: []Chest pain   []Palpitations []Edema  []PND  [] Other:  GI:  []Abdominal pain   []Nausea  []Vomiting  []Diarrhea  [] Other:  :  [] Dysuria   []Frequency  []Hematuria  []Discharge  [] Other:  Possible Pregnancy: []Yes   []No   LMP:   Musculoskeletal:  []Back pain  []Neck pain  []Recent Injury   Skin:  []Rash  [] Itching  [] Other:  Neurologic:  [] Headache  [] Focal weakness  [] Sensory changes []Other:  Endocrine:  [] Polyuria  [] Polydipsia  [] Hair Loss  [] Other:  Lymphatic:   [] Swollen glands   Psychiatric:  As per HPI      All other systems negative except as marked or mentioned/indicated in the HPI. Ngoc Milligan      PHYSICAL EXAM:  Vitals:  /79   Pulse 79   Temp 98.2 °F (36.8 °C) (Oral)   Resp 18   Ht 5' 4\" (1.626 m)   Wt 130 lb 1.6 oz (59 kg)   SpO2 100%   BMI 22.33 kg/m²         Mental Status Examination:    Level of consciousness:  within normal limits   Appearance:  MAGY - phone interview  Behavior/Motor:  MAGY - phone interview  Attitude toward examiner: cooperative  Speech:  spontaneous, normal rate and loud   Mood: euthymic  Affect:  mood congruent  Thought processes:  linear, goal directed and coherent   Thought content:  Devoid of auditory or visual hallucinations, delusions, paranoia, or any other perceptual abnormalities, denies suicidal or homicidal ideation, intent, or plan  Cognition:  oriented to person, place, and time   Concentration intact  Memory intact  Mini Mental Status MAGY - phone interview  Insight fair   Judgement fair   Fund of Knowledge adequate      DIAGNOSIS:  Major depressive disorder; recurrent, mild and without psychotic features        LABS: REVIEWED TODAY:  Recent Labs     12/01/20  0602 12/02/20  1107   WBC 3.9* 2.9*   HGB 10.9* 10.9*   * 141     Recent Labs     12/01/20  1807 12/02/20  0130 12/02/20  1107   * 126* 127*   K 4.3 4.2 4.7   CL 92* 94* 93*   CO2 24 22 26   BUN 12 9 9   CREATININE 0.8 0.7 0.8   GLUCOSE 107* 71* 78     Recent Labs     12/01/20  0602 12/02/20  1107   BILITOT 0.3 0.3   ALKPHOS 46 53   AST 23 25   ALT 17 23     Lab Results   Component Value Date    LABAMPH NOT DETECTED 03/07/2019    BARBSCNU NOT DETECTED 03/07/2019    LABBENZ NOT DETECTED 03/07/2019    LABMETH NOT DETECTED 03/07/2019    OPIATESCREENURINE NOT DETECTED 03/07/2019    PHENCYCLIDINESCREENURINE NOT DETECTED 03/07/2019    PPXUR NA 03/06/2019     Lab Results   Component Value Date    TSH 1.500 11/30/2020     No results found for: LITHIUM  No results found for: VALPROATE, CBMZ  No results found for: LITHIUM, VALPROATE    FURTHER LABS ORDERED :      Radiology   Xr Hip Right (2-3 Views)    Result Date: 12/2/2020  Right total hip arthroplasty without acute abnormality. Xr Hip Right (2-3 Views)    Result Date: 11/28/2020  EXAMINATION: TWO XRAY VIEWS OF THE RIGHT HIP 11/28/2020 4:33 pm COMPARISON: None.  HISTORY: ORDERING SYSTEM PROVIDED HISTORY: pain TECHNOLOGIST PROVIDED HISTORY: Reason for exam:->pain FINDINGS: Right hip prosthesis in anatomic orientation. No evidence of acute fracture or dislocation. Prominent degenerative changes of the left hip with joint space narrowing and subchondral sclerosis. Sacrum is intact. Pubic rami within normal limits. Right hip prosthesis in anatomic orientation. No acute pelvic or hip fracture. Degenerative changes of the left hip. Ct Head Wo Contrast    Result Date: 11/28/2020  EXAMINATION: CT OF THE HEAD WITHOUT CONTRAST  11/28/2020 6:29 pm TECHNIQUE: CT of the head was performed without the administration of intravenous contrast. Dose modulation, iterative reconstruction, and/or weight based adjustment of the mA/kV was utilized to reduce the radiation dose to as low as reasonably achievable. COMPARISON: May 2017 HISTORY: ORDERING SYSTEM PROVIDED HISTORY: fall TECHNOLOGIST PROVIDED HISTORY: Reason for exam:->fall Has a \"code stroke\" or \"stroke alert\" been called? ->No FINDINGS: BRAIN/VENTRICLES: There is no acute intracranial hemorrhage, mass effect or midline shift. No abnormal extra-axial fluid collection. The gray-white differentiation is maintained without evidence of an acute infarct. There is no evidence of hydrocephalus. Mild age-related loss of brain volume and chronic periventricular ischemic changes. Aneurysm clips again noted and unchanged since the prior examination. ORBITS: The visualized portion of the orbits demonstrate no acute abnormality. SINUSES: The visualized paranasal sinuses and mastoid air cells demonstrate no acute abnormality. SOFT TISSUES/SKULL:  Right calvarial surgical changes again noted. No acute intracranial abnormality. Surgical changes with  aneurysm clips in place. No significant interval change since the prior examination.     Ct Chest W Contrast    Result Date: 12/1/2020  EXAMINATION: CT OF THE ABDOMEN AND PELVIS WITH CONTRAST; CT OF THE CHEST WITH CONTRAST 11/30/2020 3:28 pm TECHNIQUE: CT of the abdomen and pelvis was performed with the administration of intravenous contrast. Multiplanar reformatted images are provided for review. Dose modulation, iterative reconstruction, and/or weight based adjustment of the mA/kV was utilized to reduce the radiation dose to as low as reasonably achievable.; CT of the chest was performed with the administration of intravenous contrast. Multiplanar reformatted images are provided for review. Dose modulation, iterative reconstruction, and/or weight based adjustment of the mA/kV was utilized to reduce the radiation dose to as low as reasonably achievable. COMPARISON: None HISTORY: ORDERING SYSTEM PROVIDED HISTORY: hyponatremia of uncertain etiology, concern for malignancy TECHNOLOGIST PROVIDED HISTORY: Reason for exam:->hyponatremia of uncertain etiology, concern for malignancy Additional Contrast?->None; ORDERING SYSTEM PROVIDED HISTORY: Cough, long smoking history, hyponatremia, concern for malignancy/occult pneumonia TECHNOLOGIST PROVIDED HISTORY: Reason for exam:->Cough, long smoking history, hyponatremia, concern for malignancy/occult pneumonia FINDINGS: Chest: Mediastinum: The heart size is normal.  No pericardial effusion. There are small nonenlarged mediastinal lymph nodes too small to further characterize. No axillary lymphadenopathy. Lungs/pleura: The central airways are patent. The lungs demonstrate dependent atelectasis posteriorly. The lungs are emphysematous. No pulmonary nodule or pulmonary mass. Soft Tissues/Bones: No suspicious lytic or blastic osseous lesion. Abdomen/Pelvis: Organs: Liver, spleen, gallbladder, pancreas, and adrenal glands are normal. The kidneys enhance symmetrically. GI/Bowel: Three are no findings of small bowel obstruction. No acute bowel wall inflammatory changes. Moderate amount of retained fecal debris throughout the colon. Pelvis: The bladder and rectum are grossly normal.  Beam hardening artifact right hip limits assessment of the pelvic structures.  Peritoneum/Retroperitoneum: No intraperitoneal free air or free fluid. No evidence of mesenteric or retroperitoneal lymphadenopathy. Bones/Soft Tissues: No suspicious lytic or blastic osseous lesion. Emphysema with no acute intrathoracic abnormality. No acute abdominal or pelvic abnormality. Ct Abdomen Pelvis W Iv Contrast Additional Contrast? None    Result Date: 12/1/2020  EXAMINATION: CT OF THE ABDOMEN AND PELVIS WITH CONTRAST; CT OF THE CHEST WITH CONTRAST 11/30/2020 3:28 pm TECHNIQUE: CT of the abdomen and pelvis was performed with the administration of intravenous contrast. Multiplanar reformatted images are provided for review. Dose modulation, iterative reconstruction, and/or weight based adjustment of the mA/kV was utilized to reduce the radiation dose to as low as reasonably achievable.; CT of the chest was performed with the administration of intravenous contrast. Multiplanar reformatted images are provided for review. Dose modulation, iterative reconstruction, and/or weight based adjustment of the mA/kV was utilized to reduce the radiation dose to as low as reasonably achievable. COMPARISON: None HISTORY: ORDERING SYSTEM PROVIDED HISTORY: hyponatremia of uncertain etiology, concern for malignancy TECHNOLOGIST PROVIDED HISTORY: Reason for exam:->hyponatremia of uncertain etiology, concern for malignancy Additional Contrast?->None; ORDERING SYSTEM PROVIDED HISTORY: Cough, long smoking history, hyponatremia, concern for malignancy/occult pneumonia TECHNOLOGIST PROVIDED HISTORY: Reason for exam:->Cough, long smoking history, hyponatremia, concern for malignancy/occult pneumonia FINDINGS: Chest: Mediastinum: The heart size is normal.  No pericardial effusion. There are small nonenlarged mediastinal lymph nodes too small to further characterize. No axillary lymphadenopathy. Lungs/pleura: The central airways are patent. The lungs demonstrate dependent atelectasis posteriorly. The lungs are emphysematous.   No pulmonary nodule or pulmonary mass. Soft Tissues/Bones: No suspicious lytic or blastic osseous lesion. Abdomen/Pelvis: Organs: Liver, spleen, gallbladder, pancreas, and adrenal glands are normal. The kidneys enhance symmetrically. GI/Bowel: Three are no findings of small bowel obstruction. No acute bowel wall inflammatory changes. Moderate amount of retained fecal debris throughout the colon. Pelvis: The bladder and rectum are grossly normal.  Beam hardening artifact right hip limits assessment of the pelvic structures. Peritoneum/Retroperitoneum: No intraperitoneal free air or free fluid. No evidence of mesenteric or retroperitoneal lymphadenopathy. Bones/Soft Tissues: No suspicious lytic or blastic osseous lesion. Emphysema with no acute intrathoracic abnormality. No acute abdominal or pelvic abnormality. Xr Chest Portable    Result Date: 11/28/2020  EXAMINATION: ONE XRAY VIEW OF THE CHEST 11/28/2020 6:39 pm COMPARISON: Chest series from October 30, 2014 HISTORY: ORDERING SYSTEM PROVIDED HISTORY: hyponatremia TECHNOLOGIST PROVIDED HISTORY: Reason for exam:->hyponatremia FINDINGS: Adequate and symmetric aeration of the lungs. There are no formed consolidations, pleural effusions, or pneumothoraces. Trachea and central mainstem bronchi appear clear. The cardiomediastinal silhouette and pulmonary vascularity appear within normal limits. Osseous and thoracic soft tissue structures demonstrate no acute findings. No evidence of active cardiopulmonary pathology. EKG: TRACING REVIEWED    RECOMMENDATIONS    Risk Management:  close watch    Medications:    Discussed with the treating physician/ team about the patient and treatment plan  Reviewed the chart    Discussed with Dr. Emmanuel Stokes  Will discontinue Pristiq due to potential for causing hyponatremia. Okay to continue other psychiatric medications. Recommend patient follow up outpatient with Dr. Aj Au and therapist Lisa Cordero at [facility], [place], operated by [facility] upon discharge.      Patient is not suicidal, homicidal, psychotic or manic does not meet criteria for inpatient level psychiatric treatment. Thanks for the consult and allowing us to participate in the care of Ms. Jaquez. Please call me if needed.     Electronically signed by Dread Paul on 12/2/2020 at 1:35 PM

## 2020-12-02 NOTE — PLAN OF CARE
Problem: Pain:  Goal: Control of acute pain  Description: Control of acute pain  Outcome: Met This Shift     Problem: Pain:  Goal: Control of chronic pain  Description: Control of chronic pain  Outcome: Met This Shift

## 2020-12-02 NOTE — PROGRESS NOTES
(2-3 VIEWS)    (Results Pending)         Labs:    CBC:   Recent Labs     12/01/20  0602 12/02/20  1107   WBC 3.9* 2.9*   HGB 10.9* 10.9*   * 141        No results found for: IRON, TIBC, FERRITIN    Lab Results   Component Value Date    CALCIUM 9.2 12/02/2020    CALCIUM 8.7 12/02/2020    CALCIUM 8.9 12/01/2020    PHOS 4.0 12/02/2020    PHOS 2.6 12/01/2020    PHOS 1.1 (L) 11/29/2020    MG 1.7 12/02/2020    MG 1.9 12/01/2020    MG 2.3 11/29/2020       BMP:   Recent Labs     12/01/20  1807 12/02/20  0130 12/02/20  1107   * 126* 127*   K 4.3 4.2 4.7   CL 92* 94* 93*   CO2 24 22 26   BUN 12 9 9   CREATININE 0.8 0.7 0.8   GLUCOSE 107* 71* 78             Assessment: / Plan:       1. Hyponatremia. Hyponatremia in this patient is primarily due to an SIADH-like picture. Patient is on multiple agents including Desvenlafaxine succinate, Trazodone, Bupropion and Aripiprazole all of which are associated with hyponatremia primarily through a SIADH-like mechanism. Will continue fluid restriction. Started the patient on oral sodium chloride. Sodium levels have improved appropriately. Patient was evaluated by psychiatry to see if some of her medications can be scaled back. 2.  Hypophosphatemia. This reflects poor oral intake. Improved with supplement. .      3. Anemia of of undetermined etiology. Hemoglobin lower. Will follow. Check serum iron studies.             Nikki Norris MD  Nephrology  Electronically signed by Nikki Norris MD on 12/2/2020 at 1:05 PM      Note: This report was completed utilizing computer voice recognition software. Every effort has been made to ensure accuracy, however; inadvertent computerized transcription errors may be present.

## 2020-12-02 NOTE — PROGRESS NOTES
OCCUPATIONAL THERAPY  Initial Evaluation  Date:2020  Patient Name: Jean Suarez  MRN: [id number]  : 1965  ROOM #: 1018/3813-57     Referring Provider: Aliza Whiteside DO   Evaluating OT: Tristian Gaxiola OTR/L [id number]    Placement Recommendation: HHOT   Recommended Adaptive Equipment: none     [facility]   AM-PAC Daily Activity Inpatient   How much help for putting on and taking off regular lower body clothing?: A Little  How much help for Bathing?: A Little  How much help for Toileting?: A Little  How much help for putting on and taking off regular upper body clothing?: A Little  How much help for taking care of personal grooming?: A Little  How much help for eating meals?: None  AM-PAC Inpatient Daily Activity Raw Score: 19  AM-PAC Inpatient ADL T-Scale Score : 40.22  ADL Inpatient CMS 0-100% Score: 42.8  ADL Inpatient CMS G-Code Modifier : CK    Based on patient's functional performance as stated below and their level of assistance needed prior to admission, this therapist believes that the patient would benefit from skilled Occupational Therapy following their hospital stay in an effort to increase safety and independence with completion of ADL/IADL tasks for functional independence and quality of life. Diagnosis:   1. Hypokalemia    2. Acute kidney injury ([facility] [address]    3. Hyponatremia      Pertinent Medical History:   Past Medical History:   Diagnosis Date    Anxiety     Arthritis     Chronic back pain     Depression     Fatigue     GERD (gastroesophageal reflux disease)     Headache(784.0)     Hyperlipidemia     MDRO (multiple drug resistant organisms) resistance     2 yrs ago rt arm    Memory loss     Neck pain     Numbness     down back of legs    S/P cerebral aneurysm repair 2009    clipping @ [facility]      Precautions:  Falls  Pain Scale: Numeric Rate: 7/10 R hip pain; Nursing notified.     Social history: with family: fiance        Drive: yes    Home architecture: single family home, 2 story, resides on 1st floor mainly by sleeping on day bed and uses a bedside commode, bedroom and tub shower on 2nd floor. PLOF: independent with BADL and independent with IADL, pt ambulated with no device   Equipment owned: cane, has a wheeled walker available, suction cup grab bar  Cognition: A&O x 4; follows 3 step directions. good  Problem solving skills   good  Memory    good  Sequencing   good  Judgement/safety  Communication: intact   Visual perceptual skills: intact     Glasses: yes   Edema: no     Sensation: intact   Hand Dominance:  Left     X  Right     Left Right Comment   Passive range of motion [facility]     Active range of motion [facility]     Muscle Grade 4/5 4/5    /pinch Strength Intact  Intact       Functional Assessment:   Initial Evaluation Status Date:   12/2/2020 Treatment Status  Date: STGs = LTGs  Time frame: 5 - 14 days   Feeding Independent   Independent    Grooming Supervision   Independent    UB Dressing Supervision   Independent    LB Dressing Supervision    Independent    Bathing Supervision  Independent    Toileting Supervision   Independent    Bed Mobility  Facilitated Supine to sit: Supervision   Scooting:Supervision  Sit to supine: N/T     Rolling: Supervision  Supine to sit: Independent   Scooting:Independent  Sit to supine: Independent   Rolling: Independent   Functional Transfers Supervision for transfer from EOB. Transfer training with verbal cues for hand placement throughout session to improve safety. Independent    Functional Mobility Supervision with no device  Modified [place]    Activity Tolerance Fair   Good      Balance:   Sitting balance at EOB to increase dynamic sitting balance and activities tolerance with Supervision     Standing with no device to improve balance   Endurance: fair     Comments: Upon arrival to the room the patient was supine. At end of the session, the patient was up in chair. Call light and phone within reach.  Pt required verbal cues and instruction as noted above for safe facilitation and completion of tasks. Therapist provided skilled monitoring of the patient's response during treatment session. Prior to and at the end of session, environmental modifications/line management completed for patients safety and efficiency of treatment session. OT services provided to include instruction/training on safety and adapted techniques for completion of transfers and ADL's. Overall, the patient demonstrates difficulties with completion of BADL's and IADL's. Factors contributing to these difficulties includes decreased endurance and generalized weakness. Pt would benefit from continued skilled OT to increase independence with BADL's and IADL's. Treatment:    Bed mobility: Facilitated bed mobility with cues for proper body mechanics and sequencing to prepare for ADL completion. Functional transfers: Facilitated transfers from various surfaces with cues for body alignment, safety and hand placement. ADL completion: Self-care retraining for the above-mentioned ADLs; training on proper hand placement, safety technique, sequencing, and energy conservation techniques. Postural Balance: Sitting and standing balance retraining to improve righting reactions with postural changes during ADLs. Skilled positioning: Proper positioning to improve interaction with environment, overall functioning and decrease/prevent edema and contractures. Evaluation Complexity:   · Low Complexity  · History: Brief history including review of medical records relating to the problem  · Exam: 1-3 performance Deficits  · Assistance/Modification: No assistance or modifications required to perform tasks. No comorbities affecting occupational performance.     Assessment of current deficits:   Functional mobility [x]        ADLs [x]        Strength [x]      Cognition []  Functional transfers  [x]       IADLs [x]        Safety Awareness []      Endurance [x]  Fine Motor Coordination []       Balance [x]       Vision/perception []     Sensation []   Gross Motor Coordination []       ROM []         Delirium []                          Motor Control []    Plan of Care: OT 1-3x/week for 5-7 days PRN   Instruction/training on adapted ADL techniques and AE recommendations to increased functional independence with precautions   Functional transfer/mobility training/DME recommendations for increased independence, safety, and fall prevention    Therapeutic activity to facilitate/challenge dynamic balance, standing tolerance, fine motor dexterity/in-hand manipulation for increased independence with ALD's    Functional Mobility Training   Training on energy conservation techniques/strategies to improve independence/tolerance for self care routine   Positioning to Improve Functional [place], Safety, and Skin Integrity   Patient/family education to increase follow through with safety techniques and functional independence          Rehab Potential: Good for established goals developed with patient and family. Patient / Family Goal: return home      Patient and/or family were instructed on functional diagnosis, prognosis/goals and OT plan of care. Demonstrated fair understanding. Evaluation time includes thorough review of current medical information, gathering information on past medical history/social history and prior level of function, completion of standardized testing/informal observation of tasks, assessment of data, and development of POC/Goals.     Time In: 10:55am    Time Out: 11:07am                  Min Units   OT Eval Low 70656 X     OT Eval Medium 99841     OT Eval High 01550     OT Re-Eval [id number]          ADL/Self Care 24939 1    Therapeutic Activities 68062 8    Therapeutic Ex 41400     Orthotic Management 54818     Neuro Re-Ed 30516     Non-Billable Time     TOTAL TIMED TREATMENT 9 1859 [facility] OTR/L [id number]

## 2020-12-02 NOTE — PROGRESS NOTES
generalized weakness or memory difficulty. Psch:   Denies being anxious or depressed. Musculoskeletal:    Admits to ongoing right hip pain. Denies  myalgias, joint complaints or back pain. Integumentary:   Denies any rashes, ulcers, or excoriations. Denies bruising. Hematologic/Lymphatic:  Denies bruising or bleeding. Physical Exam:  I/O this shift:  In: 240 [P.O.:240]  Out: -     Intake/Output Summary (Last 24 hours) at 12/2/2020 0916  Last data filed at 12/2/2020 0849  Gross per 24 hour   Intake 240 ml   Output 1100 ml   Net -860 ml   I/O last 3 completed shifts:  In: -   Out: 1100 [Urine:1100]  Patient Vitals for the past 96 hrs (Last 3 readings):   Weight   12/02/20 0517 130 lb 1.6 oz (59 kg)   12/01/20 0606 129 lb 11.2 oz (58.8 kg)   11/29/20 0637 130 lb (59 kg)     Vital Signs:   Blood pressure 117/79, pulse 79, temperature 98.2 °F (36.8 °C), temperature source Oral, resp. rate 18, height 5' 4\" (1.626 m), weight 130 lb 1.6 oz (59 kg), SpO2 100 %, not currently breastfeeding. General appearance:  Alert, responsive, oriented to person, place, and time. Comfortable appearing, alert, no distress. Head:  Normocephalic. No masses, lesions or tenderness. Eyes:  PERRLA. EOMI. Sclera clear. Buccal mucosa moist.  ENT:  Ears normal. Mucosa normal.  Neck:    Supple. Trachea midline. No thyromegaly. No JVD. No bruits. Heart:    Rhythm regular. Rate controlled. Systolic murmur. Lungs:    Symmetrical.  Diminished bibasilar air exchange. Clear to auscultation bilaterally. No wheezes. No rhonchi. No rales. Abdomen:   Soft. Non-tender. Non-distended. Bowel sounds positive. No organomegaly or masses. No pain on palpation. Extremities:    Peripheral pulses present. No peripheral edema. No ulcers. No cyanosis. No clubbing. Neurologic:    Alert x 3. No focal deficit. Cranial nerves grossly intact. No focal weakness.   Psych:   Behavior is normal. Mood appears normal. Speech is not rapid and/or pressured. Musculoskeletal:   Right hip with active and passive range of motion with only mild pain elicited without crepitus shortening or rotation. Direct traction application to the pelvis with compression does not elicit pain suggestive of occult pelvic fracture. Spine ROM normal. Muscular strength intact. Gait not assessed. Integumentary:  No rashes  Skin normal color and texture.   Genitalia/Breast:  Deferred    Medication:  Scheduled Meds:   lidocaine  1 patch Transdermal Daily    sodium chloride  1 g Oral TID WC    traZODone  100 mg Oral Nightly    buPROPion  75 mg Oral Daily    desvenlafaxine succinate  100 mg Oral Daily    hydrOXYzine  25 mg Oral BID    ARIPiprazole  5 mg Oral Daily    gabapentin  300 mg Oral TID    phosphorus  250 mg Oral 4x Daily    nicotine  1 patch Transdermal Daily    sodium chloride flush  10 mL Intravenous 2 times per day    enoxaparin  40 mg Subcutaneous Daily    pantoprazole  40 mg Oral QAM AC     Continuous Infusions:      Objective Data:  CBC with Differential:    Lab Results   Component Value Date    WBC 3.9 12/01/2020    RBC 3.26 12/01/2020    HGB 10.9 12/01/2020    HCT 30.7 12/01/2020     12/01/2020    MCV 94.2 12/01/2020    MCH 33.4 12/01/2020    MCHC 35.5 12/01/2020    RDW 12.4 12/01/2020    LYMPHOPCT 25.7 12/01/2020    MONOPCT 11.2 12/01/2020    BASOPCT 0.8 12/01/2020    MONOSABS 0.43 12/01/2020    LYMPHSABS 0.99 12/01/2020    EOSABS 0.12 12/01/2020    BASOSABS 0.03 12/01/2020     BMP:    Lab Results   Component Value Date     12/02/2020    K 4.2 12/02/2020    CL 94 12/02/2020    CO2 22 12/02/2020    BUN 9 12/02/2020    LABALBU 3.3 12/01/2020    CREATININE 0.7 12/02/2020    CALCIUM 8.7 12/02/2020    GFRAA >60 12/02/2020    LABGLOM >60 12/02/2020    GLUCOSE 71 12/02/2020       Wound Documentation:   Incision 11/05/14 Hip Left (Active)   Number of days: 2217       Assessment:  · Hypoosmolar hyponatremia  · Hypokalemia and hypophosphatemia · COPD without acute exacerbation, extensive smoking history  · Musculoskeletal right hip pain    · Urinary frequency and incontinence  · Complex depression and anxiety on multiple medications potentially contributing to hyponatremia  · Cerebral aneurysm with prior history of clipping  · Hyperlipidemia  · Gastroesophageal reflux disease  · Musculoskeletal pain complaints after mechanical fall  Plan:   Isabela Patel has improved clinically appropriately and with oral sodium supplementation her sodium has trended upward. Malignancy has been excluded as a contributor. She has been maintained on fluid restriction. Treatment is being administered at the discretion of the nephrology team.  It is likely this is acute on chronic in nature and she will not return entirely to a normal laboratory value however the real possibility remains that the psychiatric medications are contributing to this and they made require adjustment which would necessitate psychiatry consultation. Her mentation remains adequate despite hyponatremia and again she has improved. Given the pain reported on the right hip and a recent fall, repeat films will be performed. Symptomatic and supportive care. PT/OT. Underlying comorbidities, labs and vital signs will be monitored and addressed accordingly. Continue fluid restriction. Continue current therapy. See orders for further plan of care. More than 50% of my  time was spent at the bedside counseling/coordinating care with the patient and/or family with face to face contact. This time was spent reviewing notes and laboratory data as well as instructing and counseling the patient. Time I spent with the family or surrogate(s) is included only if the patient was incapable of providing the necessary information or participating in medical decisions. I also discussed the differential diagnosis and all of the proposed management plans with the patient and individuals accompanying the patient.     Isabela Patel requires this high level of physician care and nursing on the IMC/Telemetry unit due the complexity of decision management and chance of rapid decline or death. Continued cardiac monitoring and higher level of nursing are required. I am readily available for any further decision-making and intervention.      Amelia Sen, PHIL - CNP  12/2/2020  9:16 AM

## 2020-12-03 ENCOUNTER — APPOINTMENT (OUTPATIENT)
Dept: NUCLEAR MEDICINE | Age: 55
DRG: 426 | End: 2020-12-03
Payer: COMMERCIAL

## 2020-12-03 ENCOUNTER — APPOINTMENT (OUTPATIENT)
Dept: GENERAL RADIOLOGY | Age: 55
DRG: 426 | End: 2020-12-03
Payer: COMMERCIAL

## 2020-12-03 VITALS
TEMPERATURE: 97.9 F | DIASTOLIC BLOOD PRESSURE: 107 MMHG | RESPIRATION RATE: 18 BRPM | HEIGHT: 64 IN | OXYGEN SATURATION: 100 % | HEART RATE: 87 BPM | WEIGHT: 131.6 LBS | BODY MASS INDEX: 22.47 KG/M2 | SYSTOLIC BLOOD PRESSURE: 166 MMHG

## 2020-12-03 LAB
ALBUMIN SERPL-MCNC: 3.8 G/DL (ref 3.5–5.2)
ALP BLD-CCNC: 53 U/L (ref 35–104)
ALT SERPL-CCNC: 22 U/L (ref 0–32)
ANION GAP SERPL CALCULATED.3IONS-SCNC: 10 MMOL/L (ref 7–16)
ANION GAP SERPL CALCULATED.3IONS-SCNC: 11 MMOL/L (ref 7–16)
ANION GAP SERPL CALCULATED.3IONS-SCNC: 12 MMOL/L (ref 7–16)
AST SERPL-CCNC: 24 U/L (ref 0–31)
BASOPHILS ABSOLUTE: 0.01 E9/L (ref 0–0.2)
BASOPHILS RELATIVE PERCENT: 0.4 % (ref 0–2)
BILIRUB SERPL-MCNC: 0.3 MG/DL (ref 0–1.2)
BUN BLDV-MCNC: 10 MG/DL (ref 6–20)
BUN BLDV-MCNC: 8 MG/DL (ref 6–20)
BUN BLDV-MCNC: 9 MG/DL (ref 6–20)
C-REACTIVE PROTEIN: 0.7 MG/DL (ref 0–0.4)
CALCIUM SERPL-MCNC: 9.1 MG/DL (ref 8.6–10.2)
CALCIUM SERPL-MCNC: 9.1 MG/DL (ref 8.6–10.2)
CALCIUM SERPL-MCNC: 9.2 MG/DL (ref 8.6–10.2)
CHLORIDE BLD-SCNC: 91 MMOL/L (ref 98–107)
CHLORIDE BLD-SCNC: 92 MMOL/L (ref 98–107)
CHLORIDE BLD-SCNC: 93 MMOL/L (ref 98–107)
CO2: 25 MMOL/L (ref 22–29)
CO2: 26 MMOL/L (ref 22–29)
CO2: 27 MMOL/L (ref 22–29)
CREAT SERPL-MCNC: 0.7 MG/DL (ref 0.5–1)
EOSINOPHILS ABSOLUTE: 0.1 E9/L (ref 0.05–0.5)
EOSINOPHILS RELATIVE PERCENT: 3.7 % (ref 0–6)
FERRITIN: 494 NG/ML
GFR AFRICAN AMERICAN: >60
GFR NON-AFRICAN AMERICAN: >60 ML/MIN/1.73
GLUCOSE BLD-MCNC: 73 MG/DL (ref 74–99)
GLUCOSE BLD-MCNC: 83 MG/DL (ref 74–99)
GLUCOSE BLD-MCNC: 88 MG/DL (ref 74–99)
HCT VFR BLD CALC: 28.3 % (ref 34–48)
HEMOGLOBIN: 10.5 G/DL (ref 11.5–15.5)
IMMATURE GRANULOCYTES #: 0 E9/L
IMMATURE GRANULOCYTES %: 0 % (ref 0–5)
IRON SATURATION: 38 % (ref 15–50)
IRON: 81 MCG/DL (ref 37–145)
LYMPHOCYTES ABSOLUTE: 0.72 E9/L (ref 1.5–4)
LYMPHOCYTES RELATIVE PERCENT: 26.9 % (ref 20–42)
MAGNESIUM: 1.7 MG/DL (ref 1.6–2.6)
MCH RBC QN AUTO: 33.8 PG (ref 26–35)
MCHC RBC AUTO-ENTMCNC: 37.1 % (ref 32–34.5)
MCV RBC AUTO: 91 FL (ref 80–99.9)
MONOCYTES ABSOLUTE: 0.29 E9/L (ref 0.1–0.95)
MONOCYTES RELATIVE PERCENT: 10.8 % (ref 2–12)
NEUTROPHILS ABSOLUTE: 1.56 E9/L (ref 1.8–7.3)
NEUTROPHILS RELATIVE PERCENT: 58.2 % (ref 43–80)
PDW BLD-RTO: 12.1 FL (ref 11.5–15)
PHOSPHORUS: 3.7 MG/DL (ref 2.5–4.5)
PLATELET # BLD: 133 E9/L (ref 130–450)
PMV BLD AUTO: 8.7 FL (ref 7–12)
POTASSIUM SERPL-SCNC: 3.7 MMOL/L (ref 3.5–5)
POTASSIUM SERPL-SCNC: 3.8 MMOL/L (ref 3.5–5)
POTASSIUM SERPL-SCNC: 3.8 MMOL/L (ref 3.5–5)
RBC # BLD: 3.11 E12/L (ref 3.5–5.5)
SEDIMENTATION RATE, ERYTHROCYTE: 20 MM/HR (ref 0–20)
SODIUM BLD-SCNC: 128 MMOL/L (ref 132–146)
SODIUM BLD-SCNC: 129 MMOL/L (ref 132–146)
SODIUM BLD-SCNC: 130 MMOL/L (ref 132–146)
TOTAL IRON BINDING CAPACITY: 215 MCG/DL (ref 250–450)
TOTAL PROTEIN: 6.3 G/DL (ref 6.4–8.3)
WBC # BLD: 2.7 E9/L (ref 4.5–11.5)

## 2020-12-03 PROCEDURE — 6370000000 HC RX 637 (ALT 250 FOR IP): Performed by: INTERNAL MEDICINE

## 2020-12-03 PROCEDURE — 97116 GAIT TRAINING THERAPY: CPT

## 2020-12-03 PROCEDURE — 2580000003 HC RX 258: Performed by: INTERNAL MEDICINE

## 2020-12-03 PROCEDURE — 83550 IRON BINDING TEST: CPT

## 2020-12-03 PROCEDURE — 97110 THERAPEUTIC EXERCISES: CPT

## 2020-12-03 PROCEDURE — 85025 COMPLETE CBC W/AUTO DIFF WBC: CPT

## 2020-12-03 PROCEDURE — 84100 ASSAY OF PHOSPHORUS: CPT

## 2020-12-03 PROCEDURE — 6370000000 HC RX 637 (ALT 250 FOR IP): Performed by: NURSE PRACTITIONER

## 2020-12-03 PROCEDURE — 72100 X-RAY EXAM L-S SPINE 2/3 VWS: CPT

## 2020-12-03 PROCEDURE — 73562 X-RAY EXAM OF KNEE 3: CPT

## 2020-12-03 PROCEDURE — 6360000002 HC RX W HCPCS: Performed by: INTERNAL MEDICINE

## 2020-12-03 PROCEDURE — 78315 BONE IMAGING 3 PHASE: CPT

## 2020-12-03 PROCEDURE — 82728 ASSAY OF FERRITIN: CPT

## 2020-12-03 PROCEDURE — 36415 COLL VENOUS BLD VENIPUNCTURE: CPT

## 2020-12-03 PROCEDURE — 86140 C-REACTIVE PROTEIN: CPT

## 2020-12-03 PROCEDURE — A9503 TC99M MEDRONATE: HCPCS | Performed by: RADIOLOGY

## 2020-12-03 PROCEDURE — 85651 RBC SED RATE NONAUTOMATED: CPT

## 2020-12-03 PROCEDURE — 80053 COMPREHEN METABOLIC PANEL: CPT

## 2020-12-03 PROCEDURE — 83735 ASSAY OF MAGNESIUM: CPT

## 2020-12-03 PROCEDURE — 3430000000 HC RX DIAGNOSTIC RADIOPHARMACEUTICAL: Performed by: RADIOLOGY

## 2020-12-03 PROCEDURE — 83540 ASSAY OF IRON: CPT

## 2020-12-03 PROCEDURE — 80048 BASIC METABOLIC PNL TOTAL CA: CPT

## 2020-12-03 RX ORDER — TC 99M MEDRONATE 20 MG/10ML
25 INJECTION, POWDER, LYOPHILIZED, FOR SOLUTION INTRAVENOUS
Status: COMPLETED | OUTPATIENT
Start: 2020-12-03 | End: 2020-12-03

## 2020-12-03 RX ORDER — SODIUM CHLORIDE 1000 MG
1 TABLET, SOLUBLE MISCELLANEOUS
Qty: 90 TABLET | Refills: 3 | Status: SHIPPED | OUTPATIENT
Start: 2020-12-04

## 2020-12-03 RX ORDER — LIDOCAINE 4 G/G
1 PATCH TOPICAL DAILY
Qty: 15 PATCH | Refills: 1 | Status: SHIPPED | OUTPATIENT
Start: 2020-12-04

## 2020-12-03 RX ORDER — LISINOPRIL 10 MG/1
10 TABLET ORAL DAILY
Qty: 30 TABLET | Refills: 3 | Status: SHIPPED | OUTPATIENT
Start: 2020-12-03

## 2020-12-03 RX ADMIN — GABAPENTIN 300 MG: 300 CAPSULE ORAL at 12:51

## 2020-12-03 RX ADMIN — Medication 1 TABLET: at 08:18

## 2020-12-03 RX ADMIN — HYDROCODONE BITARTRATE AND ACETAMINOPHEN 1 TABLET: 5; 325 TABLET ORAL at 06:18

## 2020-12-03 RX ADMIN — BUPROPION HYDROCHLORIDE 75 MG: 75 TABLET, FILM COATED ORAL at 08:19

## 2020-12-03 RX ADMIN — SODIUM CHLORIDE TAB 1 GM 1 G: 1 TAB at 17:07

## 2020-12-03 RX ADMIN — TC 99M MEDRONATE 25 MILLICURIE: 20 INJECTION, POWDER, LYOPHILIZED, FOR SOLUTION INTRAVENOUS at 12:02

## 2020-12-03 RX ADMIN — SODIUM CHLORIDE TAB 1 GM 1 G: 1 TAB at 11:42

## 2020-12-03 RX ADMIN — GABAPENTIN 300 MG: 300 CAPSULE ORAL at 08:18

## 2020-12-03 RX ADMIN — PANTOPRAZOLE SODIUM 40 MG: 40 TABLET, DELAYED RELEASE ORAL at 06:18

## 2020-12-03 RX ADMIN — SODIUM CHLORIDE TAB 1 GM 1 G: 1 TAB at 08:19

## 2020-12-03 RX ADMIN — ARIPIPRAZOLE 5 MG: 5 TABLET ORAL at 08:19

## 2020-12-03 RX ADMIN — HYDROCODONE BITARTRATE AND ACETAMINOPHEN 1 TABLET: 5; 325 TABLET ORAL at 00:15

## 2020-12-03 RX ADMIN — Medication 10 ML: at 08:20

## 2020-12-03 RX ADMIN — TRAMADOL HYDROCHLORIDE 100 MG: 50 TABLET, FILM COATED ORAL at 03:23

## 2020-12-03 RX ADMIN — ENOXAPARIN SODIUM 40 MG: 40 INJECTION SUBCUTANEOUS at 08:17

## 2020-12-03 RX ADMIN — HYDROCODONE BITARTRATE AND ACETAMINOPHEN 1 TABLET: 5; 325 TABLET ORAL at 12:51

## 2020-12-03 RX ADMIN — TRAMADOL HYDROCHLORIDE 100 MG: 50 TABLET, FILM COATED ORAL at 17:07

## 2020-12-03 RX ADMIN — HYDROXYZINE PAMOATE 25 MG: 25 CAPSULE ORAL at 08:19

## 2020-12-03 RX ADMIN — POLYETHYLENE GLYCOL 3350 17 G: 17 POWDER, FOR SOLUTION ORAL at 08:25

## 2020-12-03 RX ADMIN — TRAMADOL HYDROCHLORIDE 100 MG: 50 TABLET, FILM COATED ORAL at 10:47

## 2020-12-03 ASSESSMENT — PAIN SCALES - GENERAL
PAINLEVEL_OUTOF10: 9
PAINLEVEL_OUTOF10: 10
PAINLEVEL_OUTOF10: 9
PAINLEVEL_OUTOF10: 10
PAINLEVEL_OUTOF10: 0
PAINLEVEL_OUTOF10: 10
PAINLEVEL_OUTOF10: 9

## 2020-12-03 ASSESSMENT — PAIN DESCRIPTION - LOCATION
LOCATION: HIP
LOCATION: HIP

## 2020-12-03 ASSESSMENT — PAIN DESCRIPTION - ORIENTATION: ORIENTATION: RIGHT

## 2020-12-03 ASSESSMENT — PAIN DESCRIPTION - PAIN TYPE
TYPE: ACUTE PAIN
TYPE: ACUTE PAIN

## 2020-12-03 ASSESSMENT — PAIN DESCRIPTION - DESCRIPTORS: DESCRIPTORS: ACHING

## 2020-12-03 NOTE — PROGRESS NOTES
Physical Therapy Treatment Note    Room #:  8229/0477-06  Patient Name: Trang Shaw  YOB: 1965  MRN: [id number]    Referring Provider:   Shayne Chicas DO      Date of Service: 12/3/2020    Evaluating Physical Therapist: Cherise Beltran, PT #[id number]       Diagnosis:   Hyponatremia [E87.1]  Hyponatremia [E87.1]    fall last night as well as complaints of right hip pain x1 month    Patient Active Problem List   Diagnosis    Memory loss    Muscle contraction headache    Osteoarthritis of hip    Hip pain    Degenerative joint disease (DJD) of hip    Localized osteoarthrosis not specified whether primary or secondary, pelvic region and thigh    History of anterior communicating artery aneurysm repair 2009    Lumbar spinal stenosis    Lumbar degenerative disc disease    Chronic back pain    Hyponatremia    Hypo-osmolar hyponatremia        Tentative placement recommendation: Home    Equipment recommendation: [employer]      Prior Level of Function: Patient ambulated independently    Rehab Potential: good  for baseline    Past medical history:   Past Medical History:   Diagnosis Date    Anxiety     Arthritis     Chronic back pain     Depression     Fatigue     GERD (gastroesophageal reflux disease)     Headache(784.0)     Hyperlipidemia     MDRO (multiple drug resistant organisms) resistance     2 yrs ago rt arm    Memory loss     Neck pain     Numbness     down back of legs    S/P cerebral aneurysm repair 12/2009    clipping @ [facility]     Past Surgical History:   Procedure Laterality Date    APPENDECTOMY      BRAIN ANEURYSM SURGERY  12/2009    clipping of [employer] @ [address]      COLONOSCOPY      ENDOMETRIAL ABLATION      HERNIA REPAIR      HIP SURGERY      JOINT REPLACEMENT Right nov 2014    TONSILLECTOMY      UPPER GASTROINTESTINAL ENDOSCOPY         Precautions:  Up with assistance, falls ,      SUBJECTIVE:    Social history: Patient lives with firamonita   in a two story home resides first mainly by sleeping on day bed and uses a bedside commode, bedroom and tub shower on 2nd floor        Equipment owned: [employer] and [employer],       [id number] [facility]  Mobility Inpatient   How much difficulty turning over in bed?: None  How much difficulty sitting down on / standing up from a chair with arms?: A Little  How much difficulty moving from lying on back to sitting on side of bed?: A Little  How much help from another person moving to and from a bed to a chair?: A Little  How much help from another person needed to walk in hospital room?: A Little  How much help from another person for climbing 3-5 steps with a railing?: A Little  AM-PAC Inpatient Mobility Raw Score : 19  AM-PAC Inpatient T-Scale Score : 45.44  Mobility Inpatient CMS 0-100% Score: 41.77  Mobility Inpatient CMS G-Code Modifier : CK    Nursing cleared patient for PT treatment. pt states she wasmedicated for pain and time given to take effect      OBJECTIVE;   Initial Evaluation  Date: 12/02/2020 Treatment Date:    12/3/2020   Short Term/ Long Term   Goals   Was pt agreeable to Eval/treatment? Yes   yes To be met in 3 days   Pain level   7/10  Right hip Not rated     Bed Mobility    Rolling: Not assessed  patient in chair   Rolling: Independent    Supine to sit: Supervision     Sit to supine: Supervision     Scooting: Supervision      Rolling: Independent    Supine to sit:  Independent    Sit to supine: Independent    Scooting: Independent     Transfers Sit to stand: Supervision  Cues for hand placement and safety  Sit to stand: Supervision  with cues for hand placement     Sit to stand: Independent     Ambulation    1 x 10 feet, 2 x 80 feet using  wheeled walker with Supervision    improved gait with wheeled walker decreased right hip pain 2 x 75 feet using  wheeled walker with Supervision  cues for obstacle negotiation, decreased pace     150 feet using tubes intact, nursing notified. Patient would benefit from continued skilled Physical Therapy to improve functional independence and quality of life. Patient's/ family goals   home        ASSESSMENT: Patient exhibits decreased strength, balance, coordination impairing functional mobility. Right hip pain, slightly antalgic gait,pt with increased tolerance overall to activity . Plan of Care:     -Bed Mobility: Lower extremity exercises   -Sitting Balance: Incorporate reaching activities to activate trunk muscles   -Standing Balance: Perform strengthening exercises in standing to promote motor control with or without upper extremity support   -Transfers: Provide instruction on proper hand and foot position for adequate transfer of weight onto lower extremities and use of gait device  -Gait: Gait training and Standing activities to improve: base of support, weight shift, weight bearing    -Endurance: Utilize Supervised activities to increase level of endurance to allow for safe functional mobility including transfers and gait   -Stairs: Stair training with instruction on proper technique and hand placement on rail    Patient and or family understand(s) diagnosis, prognosis, and plan of care. Frequency of treatments: Patient will be seen    daily.        Time in120  Time out  145    Total Treatment Time  25 minutes        CPT codes:    Therapeutic exercises (85993)   13 minutes  1 unit(s)  Gait Training (02554) 12 minutes 1 unit(s)    Becky Velásquez, PTA [id number]

## 2020-12-03 NOTE — CARE COORDINATION
12/3/2020 1607 CM note: COVID (-) 11/28/2020. Discharge plan remains home with possible outpatient therapy per drs. Patient does not anticipate any needs and pts fiance will provide transportation home.   Electronically signed by Selene Hollins RN on 12/3/2020 at 4:10 PM

## 2020-12-03 NOTE — PROGRESS NOTES
Nephrology Note  Ryder Jaimes MD          Patient seen and examined. No family member is present at bedside. Chart reviewed. Psychiatry notes reviewed. Patient is now off Pristiq. Patient is feeling better. Denies shortness of breath. Appetite good. No nausea or dysguesia. Awake and alert . In no acute distress. Vital SignsBP (!) 164/100   Pulse 85   Temp 98.2 °F (36.8 °C) (Oral)   Resp 18   Ht 5' 4\" (1.626 m)   Wt 131 lb 9.6 oz (59.7 kg)   SpO2 99%   BMI 22.59 kg/m²   24HR INTAKE/OUTPUT:    Intake/Output Summary (Last 24 hours) at 12/3/2020 1134  Last data filed at 12/3/2020 0038  Gross per 24 hour   Intake 240 ml   Output 1150 ml   Net -910 ml         Physical Exam       Neck: No JVD  Lungs: Breath sounds decreased at the bases. No rales or ronchi. Heart: Regular rate and rhythm. No S3 gallop. No  murmrur. Abdomen: Soft non distended, non tender and normal bowel sounds.   Extremeties: No edema.        ROS:  RESPIRATORY:  negative  CARDIOVASCULAR:  negative  GASTROINTESTINAL:  negative  GENITOURINARY:  Negative       Current Facility-Administered Medications   Medication Dose Route Frequency Provider Last Rate Last Dose    nicotine (NICODERM CQ) 7 MG/24HR 1 patch  1 patch Transdermal Daily Jun Ferrell DO   1 patch at 12/03/20 1006    lidocaine 4 % external patch 1 patch  1 patch Transdermal Daily PHIL Mars - CNP   1 patch at 12/03/20 0818    traMADol (ULTRAM) tablet 50 mg  50 mg Oral Q6H PRN Uriel Lefort, DO        Or    traMADol [facility] Novel) tablet 100 mg  100 mg Oral Q6H PRN Uriel Lefort, DO   100 mg at 12/03/20 1047    HYDROcodone-acetaminophen (NORCO) 5-325 MG per tablet 1 tablet  1 tablet Oral Q6H PRN Uriel Lefort, DO   1 tablet at 12/03/20 0618    sodium chloride tablet 1 g  1 g Oral TID  Jesu Monroy MD   1 g at 12/03/20 0819    traZODone (DESYREL) tablet 100 mg  100 mg Oral Nightly Samira Dunnings, DO   100 mg at 12/02/20 2049    buPROPion [facility]) tablet 75 mg 75 mg Oral Daily Oral Salvia, DO   75 mg at 12/03/20 1487    hydrOXYzine (VISTARIL) capsule 25 mg  25 mg Oral BID Oral Salvia, DO   25 mg at 12/03/20 0890    ARIPiprazole (ABILIFY) tablet 5 mg  5 mg Oral Daily Oral Salvia, DO   5 mg at 12/03/20 4043    gabapentin (NEURONTIN) capsule 300 mg  300 mg Oral TID Oral Salvia, DO   300 mg at 12/03/20 0818    phosphorus (K PHOS NEUTRAL) tablet 1 tablet  250 mg Oral 4x Daily Oral Salvia, DO   1 tablet at 12/03/20 0818    sodium chloride flush 0.9 % injection 10 mL  10 mL Intravenous 2 times per day [place], DO   10 mL at 12/03/20 0820    sodium chloride flush 0.9 % injection 10 mL  10 mL Intravenous PRN Ildefonso Abu, DO        acetaminophen (TYLENOL) tablet 650 mg  650 mg Oral Q6H PRN [place], DO   650 mg at 12/01/20 1153    Or    acetaminophen (TYLENOL) suppository 650 mg  650 mg Rectal Q6H PRN [place], DO        polyethylene glycol (GLYCOLAX) packet 17 g  17 g Oral Daily PRN [place], DO   17 g at 12/03/20 0825    enoxaparin (LOVENOX) injection 40 mg  40 mg Subcutaneous Daily [place], DO   40 mg at 12/03/20 0817    pantoprazole (PROTONIX) tablet 40 mg  40 mg Oral QAM AC Ismail U Mihai, DO   40 mg at 12/03/20 0618       XR KNEE RIGHT (3 VIEWS)   Final Result   No acute osseous abnormality. XR LUMBAR SPINE (2-3 VIEWS)   Final Result   Multilevel degenerative changes of lumbar spine as described above. XR HIP RIGHT (2-3 VIEWS)   Preliminary Result   Right total hip arthroplasty without acute abnormality. CT CHEST W CONTRAST   Final Result   Emphysema with no acute intrathoracic abnormality. No acute abdominal or pelvic abnormality. CT ABDOMEN PELVIS W IV CONTRAST Additional Contrast? None   Final Result   Emphysema with no acute intrathoracic abnormality. No acute abdominal or pelvic abnormality. CT HEAD WO CONTRAST   Final Result   No acute intracranial abnormality. Surgical changes with  aneurysm clips in   place. No significant interval change since the prior examination. XR CHEST PORTABLE   Final Result   No evidence of active cardiopulmonary pathology. XR HIP RIGHT (2-3 VIEWS)   Final Result   Right hip prosthesis in anatomic orientation. No acute pelvic or hip fracture. Degenerative changes of the left hip. NM BONE SCAN 3 PHASE    (Results Pending)         Labs:    CBC:   Recent Labs     12/01/20  0602 12/02/20  1107 12/03/20  0624   WBC 3.9* 2.9* 2.7*   HGB 10.9* 10.9* 10.5*   * 141 133        No results found for: IRON, TIBC, FERRITIN    Lab Results   Component Value Date    CALCIUM 9.1 12/03/2020    CALCIUM 9.1 12/03/2020    CALCIUM 9.2 12/02/2020    PHOS 3.7 12/03/2020    PHOS 4.0 12/02/2020    PHOS 2.6 12/01/2020    MG 1.7 12/03/2020    MG 1.7 12/02/2020    MG 1.9 12/01/2020       BMP:   Recent Labs     12/02/20  2335 12/03/20  0347 12/03/20  0624   * 129* 130*   K 3.8 3.7 3.8   CL 91* 92* 93*   CO2 25 26 27   BUN 10 9 8   CREATININE 0.7 0.7 0.7   GLUCOSE 83 73* 88             Assessment: / Plan:    1.  Hyponatremia.  Hyponatremia in this patient is primarily due to an SIADH-like picture.  Patient is now off Pristiq (desvenlafaxine succinate). She remains on, Trazodone, Bupropion and Aripiprazole all of which are associated with hyponatremia primarily through a SIADH-like mechanism.  Will continue fluid restriction. .  Continue the patient on oral sodium chloride. Sodium levels have improved appropriately.            2.  Hypophosphatemia.  This reflects poor oral intake.  Improved with supplement. . Discontinue supplement.     3.   Anemia of of undetermined etiology.  Hemoglobin lower.  Await serum iron studies. 4.    Elevated blood pressure without the diagnosis of hypertension. Will follow.       Aden Cerna MD  Nephrology  Electronically signed by Aden Cerna MD on 12/3/2020 at 11:33 AM      Note: This report was completed utilizing computer voice recognition software. Every effort has been made to ensure accuracy, however; inadvertent computerized transcription errors may be present.

## 2020-12-03 NOTE — PROGRESS NOTES
Internal Medicine Progress Note    [facility]    Shaq Greenfield. Eliezer Schilder., F.A.C.O.I. Madhavi Bond D.O., MITCHELOLittleISHAWN Wall, MSN, APRN, NP-C  Severo Michaels. Arash Miller, MSN, APRN-CNP     Primary Care Physician: Floridalma Loera DO   Admitting Physician:  Reena Kapoor DO  Admission date and time: 11/28/2020  3:25 PM    Room:  [address] [place] [address]  Admitting diagnosis: Hyponatremia [E87.1]  Hyponatremia [E87.1]    Patient Name: Cecilia Cavanaugh  MRN: [id number]    Date of Service: 12/3/2020     Covering for Dr. Christine Ji:  Javier Gaucher is a 48 y.o. female who was seen and examined today,12/3/2020, at the bedside. The patient electrolytes seem to be relatively stable at the present time being followed by nephrology. Unfortunately she continued to complain of exquisite right sided hip pain. She does have difficulty walking and does use a walker. The topical Lidoderm patch has helped but she still complained of significant pain. She was initially seen by orthopedics but not recently. We will obtain a three-phase bone scan to rule out infection of the right hip x-ray of the lumbar spine and right knee will also be performed today. Sed rate and CRP will be checked      Review of System:   Constitutional:   Denies fever or chills, as above, unintended weight loss, malaise or fatigue  HEENT:   Denies ear pain, sore throat, sinus or eye problems. Cardiovascular:   Denies any chest pain, irregular heartbeats, or palpitations. Respiratory:   No shortness of breath. No coughing or congestion. No sputum. No hemoptysis, or wheezing. Gastrointestinal:   Denies nausea, vomiting, diarrhea, or constipation. Denies any abdominal pain. Genitourinary:    Denies any urgency, frequency, hematuria. Voiding  without difficulty.   Extremities:   Complaining of right hip pain  Neurology:    Denies any headache or focal neurological deficits, Denies generalized weakness or memory difficulty. Psch:   Denies being anxious or depressed. Musculoskeletal:    Admits to ongoing right hip pain. Denies  myalgias, joint complaints or back pain. Integumentary:   Denies any rashes, ulcers, or excoriations. Denies bruising. Hematologic/Lymphatic:  Denies bruising or bleeding. Physical Exam:  No intake/output data recorded. Intake/Output Summary (Last 24 hours) at 12/3/2020 1425  Last data filed at 12/3/2020 0038  Gross per 24 hour   Intake --   Output 1150 ml   Net -1150 ml   I/O last 3 completed shifts: In: 480 [P.O.:480]  Out: 1750 [Urine:1750]  Patient Vitals for the past 96 hrs (Last 3 readings):   Weight   12/03/20 0643 131 lb 9.6 oz (59.7 kg)   12/02/20 0517 130 lb 1.6 oz (59 kg)   12/01/20 0606 129 lb 11.2 oz (58.8 kg)     Vital Signs:   Blood pressure (!) 164/100, pulse 85, temperature 98.2 °F (36.8 °C), temperature source Oral, resp. rate 18, height 5' 4\" (1.626 m), weight 131 lb 9.6 oz (59.7 kg), SpO2 99 %, not currently breastfeeding. General appearance:  Alert, responsive, oriented to person, place, and time. Comfortable appearing, alert, no distress. Head:  Normocephalic. No masses, lesions or tenderness. Eyes:  PERRLA. EOMI. Sclera clear. Buccal mucosa moist.  ENT:  Ears normal. Mucosa normal.  Neck:    Supple. Trachea midline. No thyromegaly. No JVD. No bruits. Heart:    Rhythm regular. Rate controlled. Systolic murmur. Lungs:    Symmetrical.  Diminished bibasilar air exchange. Clear to auscultation bilaterally. No wheezes. No rhonchi. No rales. Abdomen:   Soft. Non-tender. Non-distended. Bowel sounds positive. No organomegaly or masses. No pain on palpation. Extremities:    Peripheral pulses present. No peripheral edema. No ulcers. No cyanosis. No clubbing. Right hip pain  Neurologic:    Alert x 3. No focal deficit. Cranial nerves grossly intact. No focal weakness.   Psych:   Behavior is normal. Mood appears normal. Speech is not rapid and/or pressured. Musculoskeletal:   Complains of right hip pain ambulate with walker  Integumentary:  No rashes  Skin normal color and texture.   Genitalia/Breast:  Deferred    Medication:  Scheduled Meds:   nicotine  1 patch Transdermal Daily    lidocaine  1 patch Transdermal Daily    sodium chloride  1 g Oral TID WC    traZODone  100 mg Oral Nightly    buPROPion  75 mg Oral Daily    hydrOXYzine  25 mg Oral BID    ARIPiprazole  5 mg Oral Daily    gabapentin  300 mg Oral TID    sodium chloride flush  10 mL Intravenous 2 times per day    enoxaparin  40 mg Subcutaneous Daily    pantoprazole  40 mg Oral QAM AC     Continuous Infusions:      Objective Data:  CBC with Differential:    Lab Results   Component Value Date    WBC 2.7 12/03/2020    RBC 3.11 12/03/2020    HGB 10.5 12/03/2020    HCT 28.3 12/03/2020     12/03/2020    MCV 91.0 12/03/2020    MCH 33.8 12/03/2020    MCHC 37.1 12/03/2020    RDW 12.1 12/03/2020    LYMPHOPCT 26.9 12/03/2020    MONOPCT 10.8 12/03/2020    BASOPCT 0.4 12/03/2020    MONOSABS 0.29 12/03/2020    LYMPHSABS 0.72 12/03/2020    EOSABS 0.10 12/03/2020    BASOSABS 0.01 12/03/2020     BMP:    Lab Results   Component Value Date     12/03/2020    K 3.8 12/03/2020    CL 93 12/03/2020    CO2 27 12/03/2020    BUN 8 12/03/2020    LABALBU 3.8 12/03/2020    CREATININE 0.7 12/03/2020    CALCIUM 9.1 12/03/2020    GFRAA >60 12/03/2020    LABGLOM >60 12/03/2020    GLUCOSE 88 12/03/2020       Wound Documentation:   Incision 11/05/14 Hip Left (Active)   Number of days: 2217       Assessment:    · Hypoosmolar hyponatremia  · Hypokalemia and hypophosphatemia   · COPD without acute exacerbation, extensive smoking history  · Musculoskeletal right hip pain    · Urinary frequency and incontinence  · Complex depression and anxiety on multiple medications potentially contributing to hyponatremia  · Cerebral aneurysm with prior history of clipping  · Hyperlipidemia  · Gastroesophageal reflux disease  · Musculoskeletal pain complaints after mechanical fall      Plan:       · The patient still complaining of right hip pain. Initial x-rays unremarkable, sed rate and CRP will be obtained. X-ray will be performed of the lumbar and right knee area. Three-phase bone scan will be obtained with attention to the right hip to rule out infection  · Continue topical Lidoderm patch and physical therapy  · Continue to follow with nephrology and electrolytes seem to be improving  · Continue to monitor hemoglobin adequate  · Patient has also been seen by psychiatry      More than 50% of my  time was spent at the bedside counseling/coordinating care with the patient and/or family with face to face contact. This time was spent reviewing notes and laboratory data as well as instructing and counseling the patient. Time I spent with the family or surrogate(s) is included only if the patient was incapable of providing the necessary information or participating in medical decisions. I also discussed the differential diagnosis and all of the proposed management plans with the patient and individuals accompanying the patient. Vernon Zander requires this high level of physician care and nursing on the Telemetry unit due the complexity of decision management and chance of rapid decline or death. Continued cardiac monitoring and higher level of nursing are required. I am readily available for any further decision-making and intervention.      Guanako Guevara DO [facility]  12/3/2020  2:25 PM

## 2020-12-04 NOTE — DISCHARGE SUMMARY
[address]                               DISCHARGE SUMMARY    PATIENT NAME: Sary Siu                     :        1965  MED REC NO:   [id number]                            ROOM:       0424  ACCOUNT NO:   [de-identified]                           ADMIT DATE: 2020  PROVIDER:     Kendrick Santos DO                  DISCHARGE DATE:  2020    ADMITTING DIAGNOSES:  Hypoosmolar hyponatremia, clinically improved;  electrolyte imbalance of hyperkalemia and hypophosphatemia; COPD without  acute exacerbation; extensive smoking history. SECONDARY DISCHARGE DIAGNOSES:  Musculoskeletal pain in the right hip  without evidence of any prosthetic loosening, urinary  frequency/incontinency, complex depression/anxiety, on multiple  medications, cerebral aneurysm with prior history of clipping,  hyperlipidemia, gastroesophageal reflux disease, musculoskeletal pain  after a mechanical fall. COMPLICATIONS:  None. CONSULTATIONS OBTAINED:  Dr. Carlos Sprague, Psychiatry. No OMT was given. ADMITTING PHYSICIAN:  Dr. Efraín Burris. CHIEF COMPLAINT AND HISTORY OF CHIEF COMPLAINT:  A pleasant 78-year-old  white female who was admitted to [address] [place] [address]. The patient  presented to the hospital on 2020. The patient presented to the  hospital here under the service of Dr. Efraín Burris. The patient was admitted  to the hospital here with low back pain and right hip pain occurring  after a mechanical fall. The patient had these issues going on for  approximately the past 2-3 months. The patient presented to the  emergency room; was seen and evaluated. The patient was getting out of  bed, at which time she fell. She denies any passing out. She presented  to the hospital here. She was admitted with the diagnosis of left leg  imbalance.     PAST MEDICAL HISTORY:  Positive for history of anxiety, arthritis,  chronic low back pain, depression, fatigue, gastroesophageal reflux  disease, hyperlipidemia, memory loss, neck pain, numbness and tingling,  and a history of cerebral aneurysm. PHYSICAL EXAMINATION:  VITAL SIGNS:  Showed blood pressure to be 92/50, pulse 80, respirations  18. GENERAL APPEARANCE:  This is a pleasant, 44-year-old white female. HEENT:  Normal.  LUNGS:  Course. HEART:  Fairly regular. ABDOMEN:  Soft. EXTREMITIES:  No edema. While the patient was in the hospital, she had the following laboratory  studies performed:  Bone scan showed no evidence of any prosthetic  loosening. Minimal uptake in the cervical spine. X-ray of right hip  was unremarkable. X-ray of the right knee, sed rate, CRP were  satisfactory. X-ray of the lumbar spine showed diskogenic disease. CBC  and further lab work at this time had been stable. HOSPITAL COURSE OF STAY:  The patient was admitted to the hospital under  the service of Dr. Augie Mcgrath. The patient presented to the hospital with  electrolyte imbalance. She was seen at this time by Nephrology, at  which time recommendation and further changes had been made. The  patient actually did satisfactorily, was clinically improved. Electrolyte imbalance was noticed with evidence to have complex  depression, for which psych evaluation was carried out. Adjustment was  made in medications. Dr. Augie Mcrgath initially admitted, at which time Dr. Den Feliciano and I  covered for the case. Did continue complaining of right hip pain, for  which she had seen Orthopedics and further workup included evaluation  with Dr. Oanh Dixon. We did proceed with a three-phase bone scan to rule out  any infection. X-rays of the low back and right knee were satisfactory  with the exception of arthritis. The patient improved. Optimal care  was given. The patient was felt stable enough to be discharged home on  12/03.     At the time of discharge on 12/03, revealed the following:  Her blood  pressure at this time had been 166/107 to 117/79, pulse 78, respirations  18. She was afebrile. Further lab work at this time was satisfactory. The patient was discharged on the following medications of Tylenol  p.r.n., Abilify 5 mg daily, Wellbutrin 75 daily, Neurontin 300 t.i.d. She was continued on Norco 5/325 q.4 p.r.n., Vistaril 25 q.8 p.r.n.,  Protonix 40 daily, Ultram 50 q.6 p.r.n., Desyrel 100 mg at bedtime. The  patient will also continue Pristiq 100 mg daily, which she had been on  prior to coming in. The patient will also continue Prinivil 10 mg daily  without the hydrochlorothiazide. The patient also will continue Valtrex  and Prilosec daily. More than 40 minutes were spent on the day of discharge with more than  50% of the time spent face-to-face with the patient. She will follow up  with her primary care doctor upon discharge, which is Dr. Hari Pride, along  with followup with Dr. Willis Van if indicated. The patient will also  follow up with Dr. Kyree Nieves.         Stephanie Tuttle DO    D: 12/03/2020 17:17:26       T: 12/03/2020 17:25:29     [email or web address]  Job#: [id number]     Doc#: [id number]    CC:

## 2021-06-04 ENCOUNTER — HOSPITAL ENCOUNTER (EMERGENCY)
Age: 56
Discharge: HOME | End: 2021-06-04
Payer: COMMERCIAL

## 2021-06-04 ENCOUNTER — APPOINTMENT (OUTPATIENT)
Dept: GENERAL RADIOLOGY | Age: 56
End: 2021-06-04
Payer: COMMERCIAL

## 2021-06-04 VITALS
HEART RATE: 90 BPM | DIASTOLIC BLOOD PRESSURE: 101 MMHG | TEMPERATURE: 97.1 F | OXYGEN SATURATION: 97 % | WEIGHT: 120 LBS | SYSTOLIC BLOOD PRESSURE: 156 MMHG | RESPIRATION RATE: 18 BRPM | BODY MASS INDEX: 20.6 KG/M2

## 2021-06-04 DIAGNOSIS — J44.1 COPD EXACERBATION (HCC): Primary | ICD-10-CM

## 2021-06-04 DIAGNOSIS — G24.01 TARDIVE DYSKINESIA: ICD-10-CM

## 2021-06-04 LAB
BASOPHILS ABSOLUTE: 0.02 E9/L (ref 0–0.2)
BASOPHILS RELATIVE PERCENT: 0.5 % (ref 0–2)
EOSINOPHILS ABSOLUTE: 0.04 E9/L (ref 0.05–0.5)
EOSINOPHILS RELATIVE PERCENT: 0.9 % (ref 0–6)
GFR AFRICAN AMERICAN: >60
GFR NON-AFRICAN AMERICAN: >60 ML/MIN/1.73
GLUCOSE BLD-MCNC: 103 MG/DL (ref 74–99)
HCT VFR BLD CALC: 42.5 % (ref 34–48)
HEMOGLOBIN: 15.1 G/DL (ref 11.5–15.5)
IMMATURE GRANULOCYTES #: 0.01 E9/L
IMMATURE GRANULOCYTES %: 0.2 % (ref 0–5)
LYMPHOCYTES ABSOLUTE: 0.8 E9/L (ref 1.5–4)
LYMPHOCYTES RELATIVE PERCENT: 18.7 % (ref 20–42)
MCH RBC QN AUTO: 33.5 PG (ref 26–35)
MCHC RBC AUTO-ENTMCNC: 35.5 % (ref 32–34.5)
MCV RBC AUTO: 94.2 FL (ref 80–99.9)
MONOCYTES ABSOLUTE: 0.65 E9/L (ref 0.1–0.95)
MONOCYTES RELATIVE PERCENT: 15.2 % (ref 2–12)
NEUTROPHILS ABSOLUTE: 2.76 E9/L (ref 1.8–7.3)
NEUTROPHILS RELATIVE PERCENT: 64.5 % (ref 43–80)
PDW BLD-RTO: 14.1 FL (ref 11.5–15)
PERFORMED ON: ABNORMAL
PLATELET # BLD: 158 E9/L (ref 130–450)
PMV BLD AUTO: 9.2 FL (ref 7–12)
POC CHLORIDE: 106 MMOL/L (ref 100–108)
POC CREATININE: 0.9 MG/DL (ref 0.5–1)
POC POTASSIUM: 4.6 MMOL/L (ref 3.5–5)
POC SODIUM: 136 MMOL/L (ref 132–146)
RBC # BLD: 4.51 E12/L (ref 3.5–5.5)
WBC # BLD: 4.3 E9/L (ref 4.5–11.5)

## 2021-06-04 PROCEDURE — 6370000000 HC RX 637 (ALT 250 FOR IP): Performed by: PHYSICIAN ASSISTANT

## 2021-06-04 PROCEDURE — 84295 ASSAY OF SERUM SODIUM: CPT

## 2021-06-04 PROCEDURE — 85025 COMPLETE CBC W/AUTO DIFF WBC: CPT

## 2021-06-04 PROCEDURE — 82565 ASSAY OF CREATININE: CPT

## 2021-06-04 PROCEDURE — 82435 ASSAY OF BLOOD CHLORIDE: CPT

## 2021-06-04 PROCEDURE — 36415 COLL VENOUS BLD VENIPUNCTURE: CPT

## 2021-06-04 PROCEDURE — 82947 ASSAY GLUCOSE BLOOD QUANT: CPT

## 2021-06-04 PROCEDURE — 71046 X-RAY EXAM CHEST 2 VIEWS: CPT

## 2021-06-04 PROCEDURE — 84132 ASSAY OF SERUM POTASSIUM: CPT

## 2021-06-04 PROCEDURE — 99211 OFF/OP EST MAY X REQ PHY/QHP: CPT

## 2021-06-04 RX ORDER — AZITHROMYCIN 250 MG/1
TABLET, FILM COATED ORAL
Qty: 6 TABLET | Refills: 0 | Status: SHIPPED | OUTPATIENT
Start: 2021-06-04 | End: 2021-06-14

## 2021-06-04 RX ORDER — PREDNISONE 20 MG/1
60 TABLET ORAL ONCE
Status: COMPLETED | OUTPATIENT
Start: 2021-06-04 | End: 2021-06-04

## 2021-06-04 RX ORDER — PREDNISONE 10 MG/1
40 TABLET ORAL DAILY
Qty: 20 TABLET | Refills: 0 | Status: SHIPPED | OUTPATIENT
Start: 2021-06-04 | End: 2021-06-09

## 2021-06-04 RX ORDER — ALBUTEROL SULFATE 90 UG/1
2 AEROSOL, METERED RESPIRATORY (INHALATION) ONCE
Status: COMPLETED | OUTPATIENT
Start: 2021-06-04 | End: 2021-06-04

## 2021-06-04 RX ADMIN — PREDNISONE 60 MG: 20 TABLET ORAL at 17:35

## 2021-06-04 RX ADMIN — ALBUTEROL SULFATE 2 PUFF: 90 AEROSOL, METERED RESPIRATORY (INHALATION) at 17:35

## 2021-06-04 ASSESSMENT — PAIN SCALES - GENERAL: PAINLEVEL_OUTOF10: 8

## 2021-06-04 ASSESSMENT — PAIN DESCRIPTION - PAIN TYPE: TYPE: ACUTE PAIN

## 2021-06-04 ASSESSMENT — PAIN DESCRIPTION - DESCRIPTORS: DESCRIPTORS: SHARP

## 2021-06-04 ASSESSMENT — PAIN DESCRIPTION - LOCATION: LOCATION: ABDOMEN;BACK

## 2021-06-04 ASSESSMENT — PAIN DESCRIPTION - ORIENTATION: ORIENTATION: RIGHT

## 2021-06-04 NOTE — ED PROVIDER NOTES
and tends to snack her lips and move her jaw back and forth. Calm and Cooperative. Normal thought process. Normal judgement. Lab / Imaging Results   (All laboratory and radiology results have been personally reviewed by myself)  Labs:  Results for orders placed or performed during the hospital encounter of 06/04/21   CBC Auto Differential   Result Value Ref Range    WBC 4.3 (L) 4.5 - 11.5 E9/L    RBC 4.51 3.50 - 5.50 E12/L    Hemoglobin 15.1 11.5 - 15.5 g/dL    Hematocrit 42.5 34.0 - 48.0 %    MCV 94.2 80.0 - 99.9 fL    MCH 33.5 26.0 - 35.0 pg    MCHC 35.5 (H) 32.0 - 34.5 %    RDW 14.1 11.5 - 15.0 fL    Platelets 841 662 - 049 E9/L    MPV 9.2 7.0 - 12.0 fL    Neutrophils % 64.5 43.0 - 80.0 %    Immature Granulocytes % 0.2 0.0 - 5.0 %    Lymphocytes % 18.7 (L) 20.0 - 42.0 %    Monocytes % 15.2 (H) 2.0 - 12.0 %    Eosinophils % 0.9 0.0 - 6.0 %    Basophils % 0.5 0.0 - 2.0 %    Neutrophils Absolute 2.76 1.80 - 7.30 E9/L    Immature Granulocytes # 0.01 E9/L    Lymphocytes Absolute 0.80 (L) 1.50 - 4.00 E9/L    Monocytes Absolute 0.65 0.10 - 0.95 E9/L    Eosinophils Absolute 0.04 (L) 0.05 - 0.50 E9/L    Basophils Absolute 0.02 0.00 - 0.20 E9/L   POCT Venous   Result Value Ref Range    POC Sodium 136 132 - 146 mmol/L    POC Potassium 4.6 3.5 - 5.0 mmol/L    POC Chloride 106 100 - 108 mmol/L    POC Glucose 103 (H) 74 - 99 mg/dl    POC Creatinine 0.9 0.5 - 1.0 mg/dL    GFR Non-African American >60 >=60 mL/min/1.73    GFR  >60     Performed on SEE BELOW      Imaging: All Radiology results interpreted by Radiologist unless otherwise noted. XR CHEST (2 VW)   Final Result   No pneumonia or pleural effusion.                ED Course / Medical Decision Making     Medications   albuterol sulfate  (90 Base) MCG/ACT inhaler 2 puff (2 puffs Inhalation Given 6/4/21 1735)   predniSONE (DELTASONE) tablet 60 mg (60 mg Oral Given 6/4/21 1735)        Re-Evaluations:  6/4/21      Time:     Patients condition . Consultations:             None    Procedures:   none    MDM: Had an extensive talk with patient that while I believe this is just a flareup of her COPD, I am unable to check an EKG, cardiac enzymes, D-dimer here in urgent care. If she should have any worsening of symptoms, chest tightness, racing heart then she should go to the ER immediately. I also talked to her about the possibility of tardive dyskinesia secondary to her medications. She is to discuss this with her doctor. Plan of Care/Counseling:  Physician Assistant on duty reviewed today's visit with the patient in addition to providing specific details for the plan of care and counseling regarding the diagnosis and prognosis. Questions are answered at this time and are agreeable with the plan. Assessment      1. COPD exacerbation ([address] New Problem   2. Tardive dyskinesia New Problem     This patient's ED course included: a personal history and physicial examination and re-evaluation prior to disposition  This patient has remained hemodynamically stable and remained unchanged during their ED course. Plan   Discharge home. Patient condition is good. New Medications     New Prescriptions    AZITHROMYCIN (ZITHROMAX Z-TERRY) 250 MG TABLET    Take 2 tabs on day one, followed by 1 tablet daily for 4 days. PREDNISONE (DELTASONE) 10 MG TABLET    Take 4 tablets by mouth daily for 5 days     Electronically signed by VELIA Pennington   DD: 6/4/21  **This report was transcribed using voice recognition software. Every effort was made to ensure accuracy; however, inadvertent computerized transcription errors may be present.   [facility] Holly Springsashlee[address]  06/04/21 1821

## 2022-05-12 ENCOUNTER — HOSPITAL ENCOUNTER (OUTPATIENT)
Dept: MAMMOGRAPHY | Age: 57
Discharge: HOME | End: 2022-05-14
Payer: COMMERCIAL

## 2022-05-12 VITALS — WEIGHT: 130 LBS | BODY MASS INDEX: 22.2 KG/M2 | HEIGHT: 64 IN

## 2022-05-12 DIAGNOSIS — Z12.31 ENCOUNTER FOR SCREENING MAMMOGRAM FOR MALIGNANT NEOPLASM OF BREAST: ICD-10-CM

## 2022-05-12 PROCEDURE — 77067 SCR MAMMO BI INCL CAD: CPT

## 2025-02-24 ENCOUNTER — HOSPITAL ENCOUNTER (EMERGENCY)
Age: 60
Discharge: HOME | End: 2025-02-24
Attending: EMERGENCY MEDICINE
Payer: COMMERCIAL

## 2025-02-24 VITALS
WEIGHT: 120 LBS | RESPIRATION RATE: 18 BRPM | SYSTOLIC BLOOD PRESSURE: 90 MMHG | OXYGEN SATURATION: 100 % | TEMPERATURE: 97.7 F | HEART RATE: 94 BPM | DIASTOLIC BLOOD PRESSURE: 70 MMHG | BODY MASS INDEX: 20.49 KG/M2 | HEIGHT: 64 IN

## 2025-02-24 DIAGNOSIS — K04.7 DENTAL INFECTION: Primary | ICD-10-CM

## 2025-02-24 PROCEDURE — 99283 EMERGENCY DEPT VISIT LOW MDM: CPT

## 2025-02-24 RX ORDER — IBUPROFEN 600 MG/1
600 TABLET, FILM COATED ORAL EVERY 8 HOURS PRN
Qty: 30 TABLET | Refills: 0 | Status: SHIPPED | OUTPATIENT
Start: 2025-02-24 | End: 2025-02-24

## 2025-02-24 RX ORDER — IBUPROFEN 600 MG/1
600 TABLET, FILM COATED ORAL EVERY 8 HOURS PRN
Qty: 30 TABLET | Refills: 0 | Status: SHIPPED | OUTPATIENT
Start: 2025-02-24 | End: 2025-03-06

## 2025-02-24 RX ORDER — CLINDAMYCIN HYDROCHLORIDE 150 MG/1
150 CAPSULE ORAL 4 TIMES DAILY
Qty: 40 CAPSULE | Refills: 0 | Status: SHIPPED | OUTPATIENT
Start: 2025-02-24 | End: 2025-02-24

## 2025-02-24 RX ORDER — CLINDAMYCIN HYDROCHLORIDE 150 MG/1
150 CAPSULE ORAL 4 TIMES DAILY
Qty: 40 CAPSULE | Refills: 0 | Status: SHIPPED | OUTPATIENT
Start: 2025-02-24 | End: 2025-03-06

## 2025-02-24 RX ORDER — BUPROPION HYDROCHLORIDE 300 MG/1
TABLET ORAL
COMMUNITY

## 2025-02-24 ASSESSMENT — ENCOUNTER SYMPTOMS
DIARRHEA: 0
EYE DISCHARGE: 0
NAUSEA: 0
EYE REDNESS: 0
SHORTNESS OF BREATH: 0
EYE PAIN: 0
SINUS PRESSURE: 0
COUGH: 0
VOMITING: 0
SORE THROAT: 0
WHEEZING: 0
BACK PAIN: 0
ABDOMINAL DISTENTION: 0

## 2025-02-24 ASSESSMENT — PAIN SCALES - GENERAL: PAINLEVEL_OUTOF10: 7

## 2025-02-24 ASSESSMENT — PAIN DESCRIPTION - DESCRIPTORS: DESCRIPTORS: THROBBING

## 2025-02-24 ASSESSMENT — PAIN - FUNCTIONAL ASSESSMENT: PAIN_FUNCTIONAL_ASSESSMENT: 0-10

## 2025-02-24 ASSESSMENT — PAIN DESCRIPTION - ORIENTATION: ORIENTATION: LOWER

## 2025-02-24 ASSESSMENT — PAIN DESCRIPTION - LOCATION: LOCATION: TEETH

## 2025-02-24 NOTE — ED PROVIDER NOTES
The history is provided by the patient.   Dental Pain  Location:  Generalized  Quality:  Aching  Onset quality:  Gradual  Duration:  3 days  Chronicity:  Recurrent  Context: dental caries    Associated symptoms: no fever and no headaches         Review of Systems   Constitutional:  Negative for chills and fever.   HENT:  Negative for ear pain, sinus pressure and sore throat.    Eyes:  Negative for pain, discharge and redness.   Respiratory:  Negative for cough, shortness of breath and wheezing.    Cardiovascular:  Negative for chest pain.   Gastrointestinal:  Negative for abdominal distention, diarrhea, nausea and vomiting.   Genitourinary:  Negative for dysuria and frequency.   Musculoskeletal:  Negative for arthralgias and back pain.   Skin:  Negative for rash and wound.   Neurological:  Negative for weakness and headaches.   Hematological:  Negative for adenopathy.   All other systems reviewed and are negative.       Physical Exam  Vitals and nursing note reviewed.   Constitutional:       Appearance: She is well-developed.   HENT:      Head: Normocephalic and atraumatic.      Right Ear: Tympanic membrane normal.      Left Ear: Tympanic membrane normal.      Nose: Nose normal.      Mouth/Throat:      Dentition: Gingival swelling and dental caries present.     Eyes:      Pupils: Pupils are equal, round, and reactive to light.   Cardiovascular:      Rate and Rhythm: Normal rate and regular rhythm.      Heart sounds: Normal heart sounds. No murmur heard.  Pulmonary:      Effort: Pulmonary effort is normal. No respiratory distress.      Breath sounds: Normal breath sounds. No wheezing or rales.   Abdominal:      General: Bowel sounds are normal.      Palpations: Abdomen is soft.      Tenderness: There is no abdominal tenderness. There is no guarding or rebound.   Musculoskeletal:      Cervical back: Normal range of motion and neck supple.   Skin:     General: Skin is warm and dry.   Neurological:      Mental Status: